# Patient Record
Sex: FEMALE | Race: WHITE
[De-identification: names, ages, dates, MRNs, and addresses within clinical notes are randomized per-mention and may not be internally consistent; named-entity substitution may affect disease eponyms.]

---

## 2019-02-11 ENCOUNTER — HOSPITAL ENCOUNTER (INPATIENT)
Dept: HOSPITAL 35 - ER | Age: 69
LOS: 2 days | Discharge: HOME HEALTH | DRG: 189 | End: 2019-02-13
Attending: INTERNAL MEDICINE | Admitting: INTERNAL MEDICINE
Payer: COMMERCIAL

## 2019-02-11 VITALS — SYSTOLIC BLOOD PRESSURE: 139 MMHG | DIASTOLIC BLOOD PRESSURE: 72 MMHG

## 2019-02-11 VITALS — DIASTOLIC BLOOD PRESSURE: 74 MMHG | SYSTOLIC BLOOD PRESSURE: 156 MMHG

## 2019-02-11 VITALS — SYSTOLIC BLOOD PRESSURE: 114 MMHG | DIASTOLIC BLOOD PRESSURE: 49 MMHG

## 2019-02-11 VITALS — BODY MASS INDEX: 36.48 KG/M2 | HEIGHT: 65.98 IN | WEIGHT: 227 LBS

## 2019-02-11 VITALS — SYSTOLIC BLOOD PRESSURE: 125 MMHG | DIASTOLIC BLOOD PRESSURE: 62 MMHG

## 2019-02-11 DIAGNOSIS — Z87.891: ICD-10-CM

## 2019-02-11 DIAGNOSIS — E78.00: ICD-10-CM

## 2019-02-11 DIAGNOSIS — Z99.81: ICD-10-CM

## 2019-02-11 DIAGNOSIS — Z79.899: ICD-10-CM

## 2019-02-11 DIAGNOSIS — I10: ICD-10-CM

## 2019-02-11 DIAGNOSIS — E66.09: ICD-10-CM

## 2019-02-11 DIAGNOSIS — J44.1: ICD-10-CM

## 2019-02-11 DIAGNOSIS — J96.21: Primary | ICD-10-CM

## 2019-02-11 DIAGNOSIS — Z88.6: ICD-10-CM

## 2019-02-11 DIAGNOSIS — E11.9: ICD-10-CM

## 2019-02-11 DIAGNOSIS — D64.9: ICD-10-CM

## 2019-02-11 LAB
ANION GAP SERPL CALC-SCNC: 5 MMOL/L (ref 7–16)
BUN SERPL-MCNC: 11 MG/DL (ref 7–18)
CALCIUM SERPL-MCNC: 9.2 MG/DL (ref 8.5–10.1)
CHLORIDE SERPL-SCNC: 101 MMOL/L (ref 98–107)
CO2 SERPL-SCNC: 39 MMOL/L (ref 21–32)
CREAT SERPL-MCNC: 0.8 MG/DL (ref 0.6–1)
ERYTHROCYTE [DISTWIDTH] IN BLOOD BY AUTOMATED COUNT: 15.8 % (ref 10.5–14.5)
GLUCOSE SERPL-MCNC: 240 MG/DL (ref 74–106)
HCT VFR BLD CALC: 29 % (ref 37–47)
HGB BLD-MCNC: 9.1 GM/DL (ref 12–15)
MCH RBC QN AUTO: 26.3 PG (ref 26–34)
MCHC RBC AUTO-ENTMCNC: 31.5 G/DL (ref 28–37)
MCV RBC: 83.5 FL (ref 80–100)
PLATELET # BLD: 254 THOU/UL (ref 150–400)
POTASSIUM SERPL-SCNC: 3.1 MMOL/L (ref 3.5–5.1)
RBC # BLD AUTO: 3.48 MIL/UL (ref 4.2–5)
SODIUM SERPL-SCNC: 145 MMOL/L (ref 136–145)
TROPONIN I SERPL-MCNC: <0.06 NG/ML (ref ?–0.06)
WBC # BLD AUTO: 7 THOU/UL (ref 4–11)

## 2019-02-11 PROCEDURE — 10045: CPT

## 2019-02-11 PROCEDURE — 15002 WOUND PREP TRK/ARM/LEG: CPT

## 2019-02-11 PROCEDURE — 10047: CPT

## 2019-02-11 NOTE — NUR
RECEIVED REPORT FROM MADELEINE NESS. PT ARRIVED UNIT ON WHEELCHAIR AT 1500H
ACCOMPANIED BY NURSING STAFF.  PT AMBULATED TO BATHROOM ON ARRIVAL ON NC 4.0L.
PT ON HEARTH AND CARB COUNT DIET. PT INSULIN ADM AND TOLERATES MEALS AND MEDS.
PT PREFERS ICE. PT EDUCATED AND ORIENTED TO UNIT. PT CURRENTLY SITTED AT
BEDSIDE LEANING ON BEDSIDE TABLE. PT EDUCATED TO USE CALL LIGHT. CONSULTED
RESPIRATORY TO SETUP CPAP FOR PT. PT HAS NO S/S OF DISTRESS AND CONT TO BE
MONITORED FOR SAFETY.

## 2019-02-11 NOTE — EKG
[address] [employer]
[place]  [address]
Phone:  [phone]                    ELECTROCARDIOGRAM REPORT      
_______________________________________________________________________________
 
Name:       JOHN ADRIAN ELIOT               Room #:         170-10      ADM IN  
M.R.#:      [id number]     Account #:      [id number]  
Admission:  19    Attend Phys:    Yanni Orosco MD        
Discharge:              Date of Birth:  [birth year]  
                                                          Report #: [id number]
   [id number]
_______________________________________________________________________________
THIS REPORT FOR:   //name//                          
 
                         [facility] ED
                                       
Test Date:    2019               Test Time:    11:15:23
Pat Name:     JOHN ADRIAN              Department:   
Patient ID:   [id number]            Room:         170
Gender:       F                        Technician:   ZAG
:          1950               Requested By: Sudhakar Alexander
Order Number: [id number] MD:   Demetrio Guillaume
                                 Measurements
Intervals                              Axis          
Rate:         87                       P:            73
IA:           150                      QRS:          26
QRSD:         85                       T:            50
QT:           351                                    
QTc:          423                                    
                           Interpretive Statements
Sinus rhythm
No significant abnormality
Compared to ECG 10/15/2008 12:21:48
No significant changes
 
Electronically Signed On 2019 12:57:11 CST by Demetrio Guillaume
[email or web address]
 
 
 
 
 
 
 
 
 
 
 
 
 
 
 
 
 
 
  <ELECTRONICALLY SIGNED>
   By: Demetrio Guillaume MD, [facility]   
  19     1257
D: 19 1115                           _____________________________________
T: 19 1115                           Demetrio Guillaume MD, FACC     /EPI

## 2019-02-12 VITALS — SYSTOLIC BLOOD PRESSURE: 157 MMHG | DIASTOLIC BLOOD PRESSURE: 86 MMHG

## 2019-02-12 VITALS — DIASTOLIC BLOOD PRESSURE: 85 MMHG | SYSTOLIC BLOOD PRESSURE: 137 MMHG

## 2019-02-12 VITALS — DIASTOLIC BLOOD PRESSURE: 60 MMHG | SYSTOLIC BLOOD PRESSURE: 126 MMHG

## 2019-02-12 VITALS — DIASTOLIC BLOOD PRESSURE: 93 MMHG | SYSTOLIC BLOOD PRESSURE: 149 MMHG

## 2019-02-12 LAB
ANION GAP SERPL CALC-SCNC: 7 MMOL/L (ref 7–16)
BUN SERPL-MCNC: 13 MG/DL (ref 7–18)
CALCIUM SERPL-MCNC: 9.7 MG/DL (ref 8.5–10.1)
CHLORIDE SERPL-SCNC: 98 MMOL/L (ref 98–107)
CO2 SERPL-SCNC: 33 MMOL/L (ref 21–32)
CREAT SERPL-MCNC: 0.9 MG/DL (ref 0.6–1)
ERYTHROCYTE [DISTWIDTH] IN BLOOD BY AUTOMATED COUNT: 16.2 % (ref 10.5–14.5)
EST. AVERAGE GLUCOSE BLD GHB EST-MCNC: 174 MG/DL
GLUCOSE SERPL-MCNC: 327 MG/DL (ref 74–106)
GLYCOHEMOGLOBIN (HGB A1C): 7.7 % (ref 4.8–5.6)
HCT VFR BLD CALC: 32 % (ref 37–47)
HGB BLD-MCNC: 9.9 GM/DL (ref 12–15)
MCH RBC QN AUTO: 25.9 PG (ref 26–34)
MCHC RBC AUTO-ENTMCNC: 31 G/DL (ref 28–37)
MCV RBC: 83.3 FL (ref 80–100)
PLATELET # BLD: 315 THOU/UL (ref 150–400)
POTASSIUM SERPL-SCNC: 4 MMOL/L (ref 3.5–5.1)
RBC # BLD AUTO: 3.85 MIL/UL (ref 4.2–5)
SODIUM SERPL-SCNC: 138 MMOL/L (ref 136–145)
WBC # BLD AUTO: 10 THOU/UL (ref 4–11)

## 2019-02-12 PROCEDURE — 5A09357 ASSISTANCE WITH RESPIRATORY VENTILATION, LESS THAN 24 CONSECUTIVE HOURS, CONTINUOUS POSITIVE AIRWAY PRESSURE: ICD-10-PCS | Performed by: INTERNAL MEDICINE

## 2019-02-12 NOTE — NUR
ASSUMED CARE OF PT 0700. DENIES PAIN, SOA WITH ACTIVITY. VSS, COMPLIANT WITH
CARES. SEEN BY DR KING TODAY WHO CLARIFIED PT CAN HAVE 2 CUPS DECAF COFFEE
DAILY NO OTHER CHANGES WILL BE MADE TO DIET AT THIS POINT. MEDICATIONS
CLARIFIED WITH PT PHARMACY. PT MOVED TO [facility].

## 2019-02-12 NOTE — NUR
ORDER RECEIVED FOR EVAL AND TREAT.  Pt IS UP AD TOMMY PER NURSING NOTES.  SPOKE
TO Pt WHO STATES SHE IS NOT HAVING ANY DIFFICULTY WITH MOBILITY AND HAS BEEN
UP AND DOWN TO THE BATHROOM.  Pt DECLINING FORMAL P.T. EVAL BUT STATES SHE
WILL CONTINUE TO MOBILIZE ON HER OWN

## 2019-02-12 NOTE — NUR
PT AWAKE AND ALERT AT THE TIME OF SHIFT CHANGE. SITTING ON THE EDGE OF THE
BED. NO COMPLAINTS AT THIS TIME. UP AD TOMMY, ON 4L NC, NO SIGNS OF DISTRESS.
 
BLOOD SUGAR HIGH AND PT REQUESTING SUGARY SNACKS AND DRINKS. EDUCATION WAS
GIVEN ON CARB CONTROL, STEROIDS AND THE HEALING PROCESS. WAS ABLE TO REDIRECT
PT TO SUGAR FREE JELLO AND DIET SPRITE. 12U SLIDING SCALE INSULIN GIVEN.
 
PT HAD NO C/O PAIN OVERNIGHT. WAS ONLY ABLE TO SLEEP ABOUT 3 HOURS WHICH SHE
STATES IS NORMAL FOR HER. ON CPAP WHILE SLEEPING. O2 SATS REMAINED >92%.
RESTLESS LEGS BOTHERED HER OVERNIGHT AND PT ASKED WHY SHE HAD NOT RECEIVED HER
REQUIP. SPOKE TO KAYLEEN CIFUENTES. ORDER RECEIVED TO RESTART REQUIP 1MG PO QHS,
INSTEAD OF THE THREE TIMES DAILY SHE TAKES IT AT HOME D/T HER COPD
EXACCERBATION.
 
PROGRESSING TOWARDS MEETING GOALS

## 2019-02-12 NOTE — 2DMMODE
[facility]
[address]
[place]  [address]
Phone:  [phone] 2 D/M-MODE ECHOCARDIOGRAM     
_______________________________________________________________________________
 
Name:            JOHN ADRIAN [place]               Room #:        455-P       ADM IN
M.R.#:           [id number]          Account #:     [id number]  
Admission:       19         Attend Phys:   Yanni Orosco MD    
Discharge:                  Date of Birth: [birth year]  
Date of Service: 19 0908               Report #:      [id number]
        [id number]
_______________________________________________________________________________
THIS REPORT FOR:   //name//                          
 
 
--------------- APPROVED REPORT --------------
 
 
Study performed:  2019 08:15:06
 
EXAM: Comprehensive 2D, Doppler, and color-flow 
Echocardiogram 
Patient Location: Echo lab   
Room #:  [facility]     Status:  routine
 
      BSA:         2.11
HR: 98 bpm BP:          149/93 mmHg 
Rhythm: NSR     
 
Other Information 
Study Quality: Excellent
 
Indications
Dyspnea 
Hx: COPD, HTN, HLP, DM
 
2D Dimensions
RVDd:  31.23 mm  
IVSd:  11.03 (7-11mm) LVOT Diam:  19.98 (18-24mm) 
LVDd:  49.53 mm  
PWd:  11.90 (7-11mm) Ascending Ao:  30.87 (22-36mm)
LVDs:  34.17 (25-40mm) 
Aortic Root:  30.44 mm 
 
Volumes
Left Atrial Volume (Systole) 
Single Plane 4CH:  48.54 mL Single Plane 2CH:  53.54 mL
    LA ESV Index:  25.00 mL/m2
 
Aortic Valve
AoV Peak Delroy.:  2.03 m/s 
AO Peak Gr.:  16.55 mmHg LVOT Max P.83 mmHg
    LVOT Max V:  1.31 m/s
SUNIL Vmax: 2.01 cm2  
 
Mitral Valve
    E/A Ratio:  0.8
    MV Decel. Time:  168.75 ms
 
 
[facility]
[address]
[place]  [address]
Phone:  [phone]                    2 D/M-MODE ECHOCARDIOGRAM     
_______________________________________________________________________________
 
Name:            ADRIANJOHN [place]               Room #:        455-[facility] [place]
..#:           [id number]          Account #:     [id number]  
Admission:       19         Attend Phys:   Yanni Orosco MD    
Discharge:                  Date of Birth: [birth year]  
Date of Service: 19 0908               Report #:      [id number]
        [id number]
_______________________________________________________________________________
MV E Max Delroy.:  1.09 m/s 
MV A Delroy.:  1.40 m/s  
MV PHT:  48.94 ms  
IVRT:  48.44 ms   
 
Pulmonary Valve
PV Peak Delroy.:  1.31 m/s PV Peak Gr.:  6.91 mmHg
 
Pulmonary Vein
P Vein S:    0.69 m/s P Vein A:  0.39 m/s
P Vein D:   0.58 m/s P Vein A Dur.:  106.1 msec
P Vein S/D Ratio:  1.19 
 
Tricuspid Valve
TR Peak Delroy.:  2.52 m/s  RAP Estimate:  5.00 mmHg
TR Peak Gr.:  25.33 mmHg 
    PA Pressure:  30.00 mmHg
 
Left Ventricle
The left ventricle is normal size. There is normal LV segmental wall 
motion. Borderline concentric left ventricular hypertrophy. Left 
ventricular systolic function is normal. LVEF is 60-65%. Mild 
diastolic dysfunction is present (impaired relaxation 
pattern).
 
Right Ventricle
The right ventricle is normal size. The right ventricular systolic 
function is normal.
 
Atria
The left atrium size is normal. The right atrium size is 
normal.
 
Aortic Valve
The aortic valve is not well visualized. No aortic regurgitation is 
present. There is no aortic valvular stenosis.
 
Mitral Valve
The mitral valve is normal in structure. Mild mitral 
regurgitation.
 
Tricuspid Valve
The tricuspid valve is normal in structure. Trace tricuspid 
regurgitation. Estimated PAP is 30mmHg.
 
Pulmonic Valve
 
 
[facility]
[address] [place] [address]
Phone:  [phone]                    2 D/M-MODE ECHOCARDIOGRAM     
_______________________________________________________________________________
 
Name:            JOHN ADRIAN [place]               Room #:        455-P       [facility] [place]
.R.#:           [id number]          Account #:     [id number]  
Admission:       19         Attend Phys:   Yanni Orosco MD    
Discharge:                  Date of Birth: [birth year]  
Date of Service: 19 0908               Report #:      [id number]
        [id number]
_______________________________________________________________________________
Pulmonic valve is not well visualized.
 
Great Vessels
The aortic root is normal in size. The ascending aorta is normal in 
size. IVC is normal in size and collapses >50% with 
inspiration.
 
Pericardium
There is no pericardial effusion.
 
<Conclusion>
Left ventricular systolic function is normal.
There is normal LV segmental wall motion.
LVEF is 60-65%. Mild diastolic dysfunction
The aortic valve is not well visualized. No aortic regurgitation or 
stenosis
The mitral valve is normal in structure. Mild mitral 
regurgitation.
Trace tricuspid regurgitation. Estimated pulmonary artery pressure of 
30mmHg.
There is no pericardial effusion.
 
 
 
 
 
 
 
 
 
 
 
 
 
 
 
 
 
 
 
 
 
 
 
  <ELECTRONICALLY SIGNED>
   By: Demetrio Guillaume MD, [facility]   
  19
D: 19                           _____________________________________
T: 19                           Demetrio Guillaume MD, FACC     /INF

## 2019-02-12 NOTE — NUR
PATIENT TRANSFERRED FROM [facility], REPORT FROM DRAKE/RN. PATIENT ALERT AND
ORIENTED X 4. PATIENT UP AD TOMMY TO BATHROOM. PATIENT HAS O2 AT 4 LITERS/NC IN
PLACE AND SHE USE AT HOME/CPAP AT NIGHT. PATIENT'S BLOOD SUGAR HAS BEEN
ELEVATED SINCE LUNCH 453, NOTIFIED DR KING, SHE ORDERED TO REMOVE LUNCH TRAY
CHECK HOURLY UNTIL UNDER 250, CONTINUE GIVING INSULIN EACH TIME PER S/S, S/S
SCALE CHANGED TO MODERATE, AND START LANTUS 20 UNITS TONIGHT. LAST BLOOD SUGAR
265 AT 1820, 12 UNITS GIVEN. THIS RN CALLED DR KING AT 1700 TO GIVE UPDATE,
NO RETURN CALL BACK, PATIENT ATE HER DINNER. WILL CONTINUE TO MONITOR.

## 2019-02-13 VITALS — DIASTOLIC BLOOD PRESSURE: 97 MMHG | SYSTOLIC BLOOD PRESSURE: 182 MMHG

## 2019-02-13 VITALS — SYSTOLIC BLOOD PRESSURE: 170 MMHG | DIASTOLIC BLOOD PRESSURE: 93 MMHG

## 2019-02-13 LAB
ANION GAP SERPL CALC-SCNC: 7 MMOL/L (ref 7–16)
BUN SERPL-MCNC: 16 MG/DL (ref 7–18)
CALCIUM SERPL-MCNC: 9.6 MG/DL (ref 8.5–10.1)
CHLORIDE SERPL-SCNC: 98 MMOL/L (ref 98–107)
CO2 SERPL-SCNC: 34 MMOL/L (ref 21–32)
CREAT SERPL-MCNC: 0.9 MG/DL (ref 0.6–1)
ERYTHROCYTE [DISTWIDTH] IN BLOOD BY AUTOMATED COUNT: 16.4 % (ref 10.5–14.5)
GLUCOSE SERPL-MCNC: 276 MG/DL (ref 74–106)
HCT VFR BLD CALC: 29.3 % (ref 37–47)
HGB BLD-MCNC: 9.4 GM/DL (ref 12–15)
MCH RBC QN AUTO: 26.8 PG (ref 26–34)
MCHC RBC AUTO-ENTMCNC: 32.2 G/DL (ref 28–37)
MCV RBC: 83 FL (ref 80–100)
PLATELET # BLD: 262 THOU/UL (ref 150–400)
POTASSIUM SERPL-SCNC: 4 MMOL/L (ref 3.5–5.1)
RBC # BLD AUTO: 3.53 MIL/UL (ref 4.2–5)
SODIUM SERPL-SCNC: 139 MMOL/L (ref 136–145)
WBC # BLD AUTO: 8.4 THOU/UL (ref 4–11)

## 2019-02-13 NOTE — NUR
INITIAL ASSESSMEN:
MICHELLE reviewed chart and spoke with nursing and attending physician. Pt was
admitted from home due to exacerbation of COPD/Hypoxia. Pt was [facility] to
[facility] from  and is progressing towards goals for discharge. MICHELLE met
[facility] pt at bedside. Introduced role of SW. Pt is alert/orientated x 4. Pt
reports she lives at home with her dtr. Prior to admission, pt was independent
with ADLs. Pt has a walker at home. Pt has home O2 and cpap provided by
[facility]. Pt is normally on 4L of continuous O2. Pt is currently on
service with [facility] for HH services. RN and PT see pt twice per week. Pt's PCP is
Dr. Zamzam Montaño. Plan is for pt to discharge home and resume HH when
medically stable. Pt's family will be able to provide transportation home. MICHELLE
is following to assist as needed with discharge planning.

## 2019-02-13 NOTE — NUR
Pt A/OX4,up ad bree in room. C/o a headache at HS medicated with Tylenol with
relief reported. Pt's accucheck was 236 at 2140 so no more hourly accuchecks
done and pt was pleased with the results as well. VSS.Voiding without any
difficulties voiced. Pt wore the CPAP on until 0200 and requested to be turned
off and back on O2 @ 4L/NC.Resting on the recliner at this time eyes closed,no
distress noted. Call light/personal items placed within reach. Will continue
to monitor pt.

## 2019-02-13 NOTE — NUR
dp faxed clinical info and dc orders to [facility], dp called [facility] to verify they
received, patient home today.

## 2019-02-13 NOTE — HC
[facility]
[address]
[place]   [address]                     CONSULTATION                  
_______________________________________________________________________________
 
Name:       JOHN ADRIAN               Room #:         226-P       [facility] [place]  
.R.#:      [id number]                       Account #:      [id number]  
Admission:  02/11/19    Attend Phys:    Yanni Orosco MD        
Discharge:  02/13/19    Date of Birth:  [birth year]  
                                                          Report #: [id number]
                                                                    [id number]   
_______________________________________________________________________________
THIS REPORT FOR:   //name//                          
 
CC: Zamzam Orosco
 
DATE OF SERVICE:  02/11/2019
 
 
TYPE OF REPORT:  Pulmonary consultation. 
 
REFERRING PHYSICIAN:  Yanni Orosco M.D. 
 
REASON FOR REFERRAL:  COPD.
 
HISTORY OF PRESENT ILLNESS:  The patient is a 68-year-old white female who
presents to the Emergency Room with progressive dyspnea.  She has known history
of COPD.  A Pulmonary consultation was requested.
 
The patient states that she is followed normally by the pulmonologist at
[facility].  She states that she has had frequent hospitalizations
over the past year.  She has been hospitalized at least once or twice per month
over the last several months.  She was told by the [facility] that
she could no longer be there as they have nothing else to offer her.  For that
reason, the patient is here for further evaluation.  Her pulmonologist practices
at [facility].
 
The patient has smoked for many years but quit in 2017.  She knows that she gets
recurrent dyspnea.  As mentioned above, she has had trouble with frequent
recurrent exacerbation of COPD and the reasons of this is unclear.
 
On this occasion, she has been doing fair until the past few days prior to
presentation, she noticed increasing dyspnea and lower extremity edema. 
Otherwise, denies any chest pain, night sweats or chills, productive cough or
hemoptysis.
 
She is on chronic O2 at 4 liters.
 
PAST MEDICAL HISTORY:  Notable for COPD, O2 dependent at 4 liters; diabetes
mellitus type 2 and hypercholesterolemia.
 
ALLERGIES:  ASPIRIN, which causes hives.
 
HOME MEDICATIONS:  Include zolpidem, Norvasc, Lipitor, clonazepam, Daliresp,
iron, Lasix, Avapro, Janumet, Synthroid, Mobic, Toprol-XL, Proventil and Requip.
 
FAMILY HISTORY:  Noncontributory.
 
 
 
[facility]
[address] [place]   [address]                     CONSULTATION                  
_______________________________________________________________________________
 
Name:       JOHN ADRIAN ELIOT               Room #:         226-P       [facility] [place]  
.R.#:      [id number]                       Account #:      [id number]  
Admission:  02/11/19    Attend Phys:    Yanni Orosco MD        
Discharge:  02/13/19    Date of Birth:  [birth year]  
                                                          Report #: [id number]
                                                                    [id number]   
_______________________________________________________________________________
 
SOCIAL HISTORY:  Tobacco use as mentioned above, having smoked most of her life
until 2017.  She denies any alcohol use.  She lives in [place] [facility].
 
REVIEW OF SYSTEMS:  As mentioned above.  It is notable for frequent
hospitalizations over the past year for dyspnea.
 
PHYSICAL EXAMINATION:
GENERAL:  She is awake and alert, in no distress.  
VITAL SIGNS:  Temperature is 97.7 degrees Fahrenheit, pulse is 100, respiratory
rate is 20, blood pressure 157/86 mmHg and saturation 97%.
HEENT:  Normocephalic and atraumatic.
NECK:  Supple, without any lymphadenopathy or thyromegaly.
CHEST:  Breath sounds are fair bilaterally.  Mild expiratory wheezes.
CARDIOVASCULAR:  Normal S1 and S2.  No murmurs or gallop.  There is no JVD. 
There is no carotid bruit.  Pulses are 2+/4+ bilaterally.
ABDOMEN:  Soft and nontender.  No organomegaly or masses felt.
GENITOURINARY:  Deferred.
RECTAL:  Deferred.
EXTREMITIES:  There is no cyanosis, clubbing or edema.
 
RADIOLOGICAL DATA:  EKG shows no acute ischemic changes.  Chest x-ray shows mild
bibasilar atelectasis.
 
IMPRESSION:
1.  Recurrent dyspnea in this 68-year-old white female.  According to the
patient, she has had multiple hospitalizations in the past for chronic
obstructive pulmonary disease exacerbation.  Cause of his recurrent exacerbation
is unclear.
2.  Chronic obstructive pulmonary disease, with frequent exacerbation.  Possible
cause may be a nocturnal reflux, possible sleep related breathing disorder. 
Recommend the patient follow closely with a pulmonologist.
3.  Moderate obesity, the patient should be screened for sleep disorder that
this can be done as an outpatient.
4.  Diabetes mellitus type 2.
5.  Hypertension.
 
RECOMMENDATIONS:  Agree with current medical treatment plans including
bronchodilators, corticosteroids and broad-spectrum antibiotics.  DVT and GI
prophylaxis recommended.
 
As mentioned above with frequent hospitalizations, we would recommend that she
follow closely with the physicians including a pulmonologist.  Also, as an
outpatient, she should be screened for possible sleep related breathing disorder
given the above frequent COPD exacerbations.
 
 
 
 
[address]   [address]                     CONSULTATION                  
_______________________________________________________________________________
 
Name:       JOHN ADRIAN ELIOT               Room #:         226-P       [facility] [place]  
M.R.#:      [id number]                       Account #:      [id number]  
Admission:  02/11/19    Attend Phys:    Yanni Orosco MD        
Discharge:  02/13/19    Date of Birth:  [birth year]  
                                                          Report #: [id number]
                                                                    [id number]   
_______________________________________________________________________________
Lastly, we would recommend echocardiogram to rule out LV dysfunction resulting
in heart failure, etc.
 
Thank you for this consultation.
 
 
 
 
 
 
 
 
 
 
 
 
 
 
 
 
 
 
 
 
 
 
 
 
 
 
 
 
 
 
 
 
 
 
 
 
 
 
 
 
  <ELECTRONICALLY SIGNED>
   By: Ajit Headley MD                  
  02/13/19     1939
D: 02/12/19 1639                           _____________________________________
T: 02/12/19 2237                           Ajit Headley MD                    /nt

## 2019-02-13 NOTE — NUR
ASSUMED CARE OF PATIENT AT 0715, PATIENT ALERT AND ORIENTED X 4. UP AD TOMMY.
PATIENT DENIES PAIN, BUT HAD PAIN AT IV SITE, DR WHITE NOTIFIED, IV REMOVED,
PATIENT GIVEN TRAMADOL, PAIN RESOLVED. BLOOD SUGAR MONITORING AS ORDERED,
INSULIN GIVEN PER S/S. DR WHITE DISCHARGED PATIENT TO HOME WITH HOME HEALTH.
ALL DISCHARGE PAPERWORK SENT WITH PATIENT, AND ALL PERSONAL BELONGONGS SENT
WITH PATIENT.

## 2020-06-10 ENCOUNTER — HOSPITAL ENCOUNTER (INPATIENT)
Dept: HOSPITAL 35 - ER | Age: 70
LOS: 3 days | Discharge: HOME HEALTH | DRG: 871 | End: 2020-06-13
Attending: HOSPITALIST | Admitting: HOSPITALIST
Payer: COMMERCIAL

## 2020-06-10 VITALS — HEIGHT: 65.98 IN | WEIGHT: 233.2 LBS | BODY MASS INDEX: 37.48 KG/M2

## 2020-06-10 VITALS — SYSTOLIC BLOOD PRESSURE: 151 MMHG | DIASTOLIC BLOOD PRESSURE: 90 MMHG

## 2020-06-10 VITALS — SYSTOLIC BLOOD PRESSURE: 198 MMHG | DIASTOLIC BLOOD PRESSURE: 84 MMHG

## 2020-06-10 VITALS — DIASTOLIC BLOOD PRESSURE: 90 MMHG | SYSTOLIC BLOOD PRESSURE: 162 MMHG

## 2020-06-10 VITALS — SYSTOLIC BLOOD PRESSURE: 116 MMHG | DIASTOLIC BLOOD PRESSURE: 75 MMHG

## 2020-06-10 DIAGNOSIS — E11.40: ICD-10-CM

## 2020-06-10 DIAGNOSIS — I50.31: ICD-10-CM

## 2020-06-10 DIAGNOSIS — Z99.81: ICD-10-CM

## 2020-06-10 DIAGNOSIS — M10.9: ICD-10-CM

## 2020-06-10 DIAGNOSIS — B96.20: ICD-10-CM

## 2020-06-10 DIAGNOSIS — E87.1: ICD-10-CM

## 2020-06-10 DIAGNOSIS — J96.21: ICD-10-CM

## 2020-06-10 DIAGNOSIS — E78.5: ICD-10-CM

## 2020-06-10 DIAGNOSIS — E66.9: ICD-10-CM

## 2020-06-10 DIAGNOSIS — Z87.891: ICD-10-CM

## 2020-06-10 DIAGNOSIS — E03.9: ICD-10-CM

## 2020-06-10 DIAGNOSIS — Z79.51: ICD-10-CM

## 2020-06-10 DIAGNOSIS — M54.9: ICD-10-CM

## 2020-06-10 DIAGNOSIS — G47.00: ICD-10-CM

## 2020-06-10 DIAGNOSIS — J44.1: ICD-10-CM

## 2020-06-10 DIAGNOSIS — G89.29: ICD-10-CM

## 2020-06-10 DIAGNOSIS — M19.90: ICD-10-CM

## 2020-06-10 DIAGNOSIS — K21.9: ICD-10-CM

## 2020-06-10 DIAGNOSIS — I11.0: ICD-10-CM

## 2020-06-10 DIAGNOSIS — E11.36: ICD-10-CM

## 2020-06-10 DIAGNOSIS — A41.9: Primary | ICD-10-CM

## 2020-06-10 DIAGNOSIS — E78.00: ICD-10-CM

## 2020-06-10 DIAGNOSIS — N39.0: ICD-10-CM

## 2020-06-10 DIAGNOSIS — Z79.899: ICD-10-CM

## 2020-06-10 DIAGNOSIS — Z91.048: ICD-10-CM

## 2020-06-10 DIAGNOSIS — E11.65: ICD-10-CM

## 2020-06-10 LAB
ALBUMIN SERPL-MCNC: 3.1 G/DL (ref 3.4–5)
ALT SERPL-CCNC: 31 U/L (ref 30–65)
ANION GAP SERPL CALC-SCNC: < 0 MMOL/L (ref 7–16)
AST SERPL-CCNC: 30 U/L (ref 15–37)
BACTERIA-REFLEX: (no result) /HPF
BASOPHILS NFR BLD AUTO: 1.2 % (ref 0–2)
BILIRUB DIRECT SERPL-MCNC: < 0.1 MG/DL
BILIRUB SERPL-MCNC: 0.3 MG/DL (ref 0.2–1)
BILIRUB UR-MCNC: NEGATIVE MG/DL
BUN SERPL-MCNC: 9 MG/DL (ref 7–18)
CALCIUM SERPL-MCNC: 8 MG/DL (ref 8.5–10.1)
CHLORIDE SERPL-SCNC: 93 MMOL/L (ref 98–107)
CO2 SERPL-SCNC: 31 MMOL/L (ref 21–32)
COLOR UR: YELLOW
CREAT SERPL-MCNC: 0.8 MG/DL (ref 0.6–1)
EOSINOPHIL NFR BLD: 2 % (ref 0–3)
ERYTHROCYTE [DISTWIDTH] IN BLOOD BY AUTOMATED COUNT: 18.4 % (ref 10.5–14.5)
GLUCOSE SERPL-MCNC: 383 MG/DL (ref 74–106)
GRANULOCYTES NFR BLD MANUAL: 69.4 % (ref 36–66)
HCT VFR BLD CALC: 37.5 % (ref 37–47)
HGB BLD-MCNC: 12.4 GM/DL (ref 12–15)
KETONES UR STRIP-MCNC: NEGATIVE MG/DL
LYMPHOCYTES NFR BLD AUTO: 22.7 % (ref 24–44)
MCH RBC QN AUTO: 29.8 PG (ref 26–34)
MCHC RBC AUTO-ENTMCNC: 33.2 G/DL (ref 28–37)
MCV RBC: 89.9 FL (ref 80–100)
MONOCYTES NFR BLD: 4.7 % (ref 1–8)
NEUTROPHILS # BLD: 5 THOU/UL (ref 1.4–8.2)
PLATELET # BLD: 291 THOU/UL (ref 150–400)
POTASSIUM SERPL-SCNC: 3.7 MMOL/L (ref 3.5–5.1)
PROT SERPL-MCNC: 6 G/DL (ref 6.4–8.2)
RBC # BLD AUTO: 4.17 MIL/UL (ref 4.2–5)
RBC # UR STRIP: NEGATIVE /UL
SODIUM SERPL-SCNC: 121 MMOL/L (ref 136–145)
SP GR UR STRIP: 1.02 (ref 1–1.03)
SQUAMOUS: (no result) /LPF (ref 0–3)
TROPONIN I SERPL-MCNC: <0.06 NG/ML (ref ?–0.06)
URINE CLARITY: CLEAR
URINE GLUCOSE-RANDOM*: (no result)
URINE LEUKOCYTES-REFLEX: NEGATIVE
URINE NITRITE-REFLEX: POSITIVE
URINE PROTEIN (DIPSTICK): NEGATIVE
URINE WBC-REFLEX: (no result) /HPF (ref 0–5)
UROBILINOGEN UR STRIP-ACNC: 0.2 E.U./DL (ref 0.2–1)
WBC # BLD AUTO: 7.2 THOU/UL (ref 4–11)

## 2020-06-10 PROCEDURE — 10081 I&D PILONIDAL CYST COMP: CPT

## 2020-06-10 NOTE — NUR
ASSUMED CARE APPROX 1750.  PT ADMITTED FROM ED TO THIS UNIT.  PT ALERT AND
ORIENTED X4.  ASSESSMENT AS CHARTED AND VSS.  PT'S ADMISSION COMPLETE.
CONSENTS SIGNED AND PLACED IN CHART. SR W/ PVC'S ON TELE MONITOR.  PT ON 6LNC
WITH NO SIGNS OF RESPIRATORY DISTRESS NOTED. PT DENIES ACUTE PAIN. WILL
CONTINUE TO MONITOR.

## 2020-06-10 NOTE — EKG
[facility]
[place]   [address]                     ELECTROCARDIOGRAM REPORT      
_______________________________________________________________________________
 
Name:       JOHN ADRIAN               Room #:                     [facility]#:      [id number]                       Account #:      [id number]  
Admission:  06/10/20    Attend Phys:                          
Discharge:              Date of Birth:  [birth year]  
                                                          Report #: [id number]
                                                                    [id number]
_______________________________________________________________________________
THIS REPORT FOR:  
 
cc:  Zamzam Montaño Christine L. DO Lundgren, Craig H. MD [facility]
THIS REPORT FOR:   //name//                          
 
                         [facility] ED
                                       
Test Date:    2020-06-10               Test Time:    14:30:57
Pat Name:     JOHN ADRIAN              Department:   
Patient ID:   [id number]            Room:          
Gender:       F                        Technician:   
:          1950               Requested By: Carmina Frank
Order Number: [id number] MD:   Demetrio Guillaume
                                 Measurements
Intervals                              Axis          
Rate:         88                       P:            56
KS:           157                      QRS:          10
QRSD:         86                       T:            35
QT:           374                                    
QTc:          453                                    
                           Interpretive Statements
Sinus rhythm
No significant abnormality
Compared to ECG 2019 11:15:23
No significant changes
 
Electronically Signed On 6- 16:13:59 CDT by Demetrio Guillaume
[email or web address]
 
 
 
 
 
 
 
 
 
 
 
 
 
 
  <ELECTRONICALLY SIGNED>
   By: Demetrio Guillaume MD, [facility]   
  06/10/20     1613
D: 06/10/20 1430                           _____________________________________
T: 06/10/20 1430                           Demetrio Guillaume MD, [facility]     /EPI

## 2020-06-11 VITALS — DIASTOLIC BLOOD PRESSURE: 79 MMHG | SYSTOLIC BLOOD PRESSURE: 147 MMHG

## 2020-06-11 VITALS — DIASTOLIC BLOOD PRESSURE: 112 MMHG | SYSTOLIC BLOOD PRESSURE: 158 MMHG

## 2020-06-11 VITALS — SYSTOLIC BLOOD PRESSURE: 153 MMHG | DIASTOLIC BLOOD PRESSURE: 89 MMHG

## 2020-06-11 VITALS — SYSTOLIC BLOOD PRESSURE: 155 MMHG | DIASTOLIC BLOOD PRESSURE: 100 MMHG

## 2020-06-11 VITALS — SYSTOLIC BLOOD PRESSURE: 132 MMHG | DIASTOLIC BLOOD PRESSURE: 75 MMHG

## 2020-06-11 VITALS — SYSTOLIC BLOOD PRESSURE: 160 MMHG | DIASTOLIC BLOOD PRESSURE: 100 MMHG

## 2020-06-11 LAB
ANION GAP SERPL CALC-SCNC: 2 MMOL/L (ref 7–16)
BUN SERPL-MCNC: 9 MG/DL (ref 7–18)
CALCIUM SERPL-MCNC: 8.3 MG/DL (ref 8.5–10.1)
CHLORIDE SERPL-SCNC: 97 MMOL/L (ref 98–107)
CO2 SERPL-SCNC: 34 MMOL/L (ref 21–32)
CREAT SERPL-MCNC: 0.9 MG/DL (ref 0.6–1)
ERYTHROCYTE [DISTWIDTH] IN BLOOD BY AUTOMATED COUNT: 18.4 % (ref 10.5–14.5)
GLUCOSE SERPL-MCNC: 330 MG/DL (ref 74–106)
HCT VFR BLD CALC: 39.2 % (ref 37–47)
HGB BLD-MCNC: 12.9 GM/DL (ref 12–15)
MCH RBC QN AUTO: 29.6 PG (ref 26–34)
MCHC RBC AUTO-ENTMCNC: 32.9 G/DL (ref 28–37)
MCV RBC: 89.7 FL (ref 80–100)
PLATELET # BLD: 262 THOU/UL (ref 150–400)
POTASSIUM SERPL-SCNC: 4.6 MMOL/L (ref 3.5–5.1)
RBC # BLD AUTO: 4.37 MIL/UL (ref 4.2–5)
SODIUM SERPL-SCNC: 133 MMOL/L (ref 136–145)
WBC # BLD AUTO: 7.1 THOU/UL (ref 4–11)

## 2020-06-11 NOTE — NUR
RECEIVED PT'S CARE AROUND 0715; PT. ON BED; AOX4; DURING AM ASSESSMENT C/O
PAIN; MEDICATION GIVEN AT 0600; HOME MEDICATION UPDATE; PHYSICIAN NOTIFIED
DURING ROUNDING; AM MEDICATION GIVEN; ELEVATED BS; PHYSICIAN NOTIFIED; ORDERS
ON PLACED; THROUGH THE AFTERNOON C/O BACK PAIN; PRN PPRN PAIN MEDICATION
GIVEN; EDUCATED ABOUT FALL PREVENTIONS; NEEDS TO BE REMAINED; SR ON THE
MONITOR; ASSESSMENT AS CHARGED; FOLLOWING POC; PASSED ON REPORT;

## 2020-06-11 NOTE — HC
[facility]
Garbiele Shar[place]   [address]                     CONSULTATION                  
_______________________________________________________________________________
 
Name:       JOHN ADRIAN               Room #:         213-P       [facility] [place]  
M.R.#:      [id number]                       Account #:      [id number]  
Admission:  06/10/20    Attend Phys:    Issac Castillo MD    
Discharge:              Date of Birth:  [birth year]  
                                                          Report #: [id number]
                                                                    [id number]   
_______________________________________________________________________________
THIS REPORT FOR:  
 
cc:  Zamzam Montaño,Ross Butterfield MD                                         ~
CC: Issac Montaño
 
DATE OF SERVICE:  06/11/2020
 
 
ENDOCRINE CONSULTATION NOTE
 
CONSULTING PHYSICIAN:  Dr. Issac Casitllo.
 
PRIMARY CARE PHYSICIAN:  Dr. Zamzam Montaño.
 
REASON FOR CONSULTATION:  Severe hyperglycemia, uncontrolled type 2 diabetes
mellitus.
 
HISTORY OF PRESENT ILLNESS:  This is a 70-year-old female patient whose medical
background is significant for multiple medical issues including type 2 diabetes
mellitus that she had for several years and that is maintained on Janumet
 mg taken as 2 tablets daily in addition to Humalog supplemental scale
that she only takes as needed, which is only occasional.  She describes blood
glucose values that run between 120 and 180 mg/dL for the most part.  She denies
issues with hypoglycemia.
 
The patient describes complications relating to cataracts, but not diabetic
retinopathy, she also has been dealing with worsening issues of diabetic
neuropathy affecting both hands and feet and occasionally resulting in sleep
disturbances.  She is not known to have chronic kidney disease or coronary
artery disease.
 
Also, the patient is hypothyroid and has been so for many years.  She is
currently maintained on levothyroxine 175 mcg daily, which she admits to
occasionally missing at least a couple times a week.  She has been fatigued,
tired and unable to lose weight.
Also, the patient is hyperlipidemic and is maintained on atorvastatin 20 mg at
bedtime.  She is hypertensive and is maintained on irbesartan 300 mg daily,
amlodipine 10 mg daily.  She is also on Toprol- mg daily.
 
The patient has COPD and is maintained on oxygen 4-6 liters 24 hours daily.  She
is also maintained on prednisone 40 mg daily.
 
REVIEW OF SYSTEMS:
 
 
 
[address]   [address]                     CONSULTATION                  
_______________________________________________________________________________
 
Name:       JOHN ADRIAN Moline               Room #:         213-P       [place]  
M.R.#:      [id number]                       Account #:      [id number]  
Admission:  06/10/20    Attend Phys:    Issac Castillo MD    
Discharge:              Date of Birth:  [birth year]  
                                                          Report #: [id number]
                                                                    [id number]   
_______________________________________________________________________________
CONSTITUTIONAL:  Fatigue, tiredness, but no fever or chills or body weight
changes.
HEENT:  Negative for sore throat, sinus pain or ear drainage.
PULMONARY:  Baseline COPD, O2 dependent, prednisone dependent.  Intermittent
cough, no hemoptysis.
CARDIAC:  Dyspnea on exertion, lower extremity edema, occasional palpitations,
no chest pain.
GASTROINTESTINAL:  Negative for abdominal pain, nausea, vomiting or changes in
bowel frequency.
NEUROLOGY:  Negative for loss of consciousness, seizure activity.  Severe
frequent headaches, but she has baseline diabetic peripheral neuropathy
affecting both feet and hands.  Otherwise, review of systems noncontributory
unless mentioned in HPI.
 
PAST MEDICAL HISTORY:
1.  Type 2 diabetes mellitus.
2.  Diabetic neuropathy.
3.  Cataracts.
4.  Hypertension.
5.  Hyperlipidemia.
6.  Obesity.
7.  COPD, O2 dependent and steroid dependent.
8.  Gout.
9.  Osteoarthritis.
10.  Congestive heart failure.
 
OUTPATIENT MEDICATIONS:  Include sitagliptin/metformin 50/1000 mg 2 pills daily,
levothyroxine 175 mcg daily, Mobic 50 mg at bedtime, Toprol- mg daily,
albuterol 2 puffs q.  4 hours p.r.n., Requip 1 mg p.o. t.i.d., irbesartan 300 mg
daily, Lasix 40 mg b.i.d., ferrous sulfate 325 mg at bedtime, clonazepam 2 mg at
bedtime, atorvastatin 20 mg at bedtime, amlodipine 10 mg daily, allopurinol 100
mg daily.
 
ALLERGIES:  No known drug allergies.
 
FAMILY HISTORY:  Noncontributory.
 
SOCIAL HISTORY:  The patient lives with her daughter.  She used to be a smoker,
but quit 4 years ago.  Denies use of alcohol or illicit drugs.
 
PHYSICAL EXAMINATION:
GENERAL:  Pleasant  female patient who is not in apparent pain or
distress.
VITAL SIGNS:  Blood pressure is 155/100 mmHg, heart rate is 90 beats per minute,
respirations 20 per minute, temperature 37.0 Celsius.
CONSTITUTIONAL:  The patient is sitting upright, appears comfortable, not in
 
 
 
[place] [address]                     CONSULTATION                  
_______________________________________________________________________________
 
Name:       JOHN ADRIAN ELIOT               Room #:         213-P       [facility] [place]  
M.R.#:      [id number]                       Account #:      [id number]  
Admission:  06/10/20    Attend Phys:    Issac Castillo MD    
Discharge:              Date of Birth:  [birth year]  
                                                          Report #: [id number]
                                                                    [id number]   
_______________________________________________________________________________
apparent distress.
HEENT:  Anicteric sclerae.  Intact extraocular motions.
NECK:  Supple, without JVD or thyromegaly.
CHEST:  Noted for distant and limited air entry with scattered rales, rhonchi
and wheezes, no crackles.
HEART:  Regular rate and rhythm without murmurs or gallops.
ABDOMEN:  Soft, lax.  No guarding.  Active bowel sounds.
EXTREMITIES:  Lower extremity exam is noted for trace ankle edema bilaterally. 
No skin breaks or ulcerations.  Pedal pulses are faint.
NEUROLOGIC:  Awake, alert and oriented to time, place and person.  The remainder
of her examination is largely nonfocal other than for peripheral sensory
deficits.
PSYCHIATRIC:  Pleasant, interactive.  Normal mood and affect.  Normal thought
process.
 
LABORATORY DATA:  Blood glucose on arrival was 231 and then quickly gena to 264,
367 and most recently 400 mg/dL.  Sodium 133, potassium 4.6, chloride 97, CO2 of
34, anion gap 2, BUN 9, creatinine 0.9.  Lipase 366, AST 30, total bilirubin
0.3, direct bilirubin less than 0.1, calcium 8.3, alkaline phosphatase 119, ALT
31, total protein 6.0, albumin 3.0.  EGFR 62.  Free T4 of 0.4.  Free T3 1.23. 
White blood count 7.1, hemoglobin 12.9, hematocrit 39.2, platelets 262.  TSH
35.123.  Hemoglobin A1c in February 2019 was 7.7.
 
ASSESSMENT AND PLAN:
1.  Type 2 diabetes mellitus.  The patient is typically well maintained on
Janumet XR monotherapy.  However, she has had an acute and severe worsening in
the setting of using high dose intravenous glucocorticoids due to her COPD
exacerbation.  Given this outlook, the patient will be placed on a Humalog 12
units t.i.d. a.c. with meals in addition to Lantus 18 units daily.  Blood
glucose monitoring will commence a.c. and at bedtime and further adjustments
will be made accordingly.  I will maintain coverage with Humalog supplemental
scale, moderate intensity to be used and supplement to the above stated regimen
as needed.  Also, I would like to resume metformin 750 mg b.i.d. given the
steroid-induced hyperglycemia that we are dealing with.
2.  Hypothyroidism.  The patient has uncontrolled hypothyroidism and appears to
have issues with consistency and compliance of intake.  The patient was
counseled at length about the importance of consistent intake and maintained
control of her thyroid state.  Given her indices, I will adjust the patient's
levothyroxine dose to 250 mcg daily.  TFT followup will be needed in 6-8 weeks
from now.
3.  Hyperlipidemia.  The patient is maintained on atorvastatin therapy and
tolerates it well, she is to continue with the same.
4.  Hypertension.  The patient's level of blood pressure control has been mostly
adequate with some marginal readings, she is to continue with the same.
 
 
 
[facility]
[address]
[place]   [address]                     CONSULTATION                  
_______________________________________________________________________________
 
Name:       JOHN ADRIAN Moline               Room #:         213-P       [facility] [place]  
M.R.#:      [id number]                       Account #:      [id number]  
Admission:  06/10/20    Attend Phys:    Issac Castillo MD    
Discharge:              Date of Birth:  [birth year]  
                                                          Report #: [id number]
                                                                    [id number]   
_______________________________________________________________________________
 
I certainly appreciate this consultation by Dr. Castillo.
 
 
 
 
 
 
 
 
 
 
 
 
 
 
 
 
 
 
 
 
 
 
 
 
 
 
 
 
 
 
 
 
 
 
 
 
 
 
 
 
 
 
  <ELECTRONICALLY SIGNED>
   By: Ross Stern MD         
  06/11/20     1602
D: 06/11/20 1231                           _____________________________________
T: 06/11/20 1251                           Ross Stern MD           /nt

## 2020-06-11 NOTE — NUR
chart review. cm visited with pt via phone call. intro to cm and transition of
care ie hh. she a & o x 4 and able to make her needs know. hina reported "
live at home with daughter felicita she is my care giver, then she has to
work at night. live in small small apartment. have cane, walker and wheel
chair. daughter runs errands if need something. drt drives. manage own
medication. o2 4 L/nc cont then if can not breathe increase to 5L. have
breathing tx and cpap at home. all from [facility]. all equipment function
properly. medical [facility] is trying to get me new cpap machine. hh in
past"/hina. will cont following as needed for dc needs.

## 2020-06-11 NOTE — NUR
ASSUMED CARE 1900. PT ALERT AND ORIENTED. VSS. DENIES CHEST PAIN, NAUSEA OR
VOMITING.  PT C/O SOB, SOLUMEDROL GIVEN AS SCHEDULED.  NEW IV START BY ICU
NURSE IN THE LEFT AC. PT CONTINUE TO REPORT BACK PAIN. HYDROCODONE PRN GIVEN.
ON 5L OF 02. INDIPENDENT ADLs. VOIDING PER BEDSIDE COMMODE. PT PERIODICALLY
WHEEZES. SCHEDULED NEBULIZERS. NO FURTHER CONCERNS. WILL CONTINUE TO MONITOR.

## 2020-06-11 NOTE — 2DMMODE
[facility]
Gabrie[address] [place]   [address]                   2 D/M-MODE ECHOCARDIOGRAM     
_______________________________________________________________________________
 
Name:       JOHN ADRIAN ELIOT               Room #:         213-P       [place]  
M.R.#:      [id number]                       Account #:      [id number]  
Admission:  06/10/20    Attend Phys:    Isasc Castillo MD    
Discharge:              Date of Birth:  [birth year]  
                                                          Report #: [id number]
                                                                    [id number]
_______________________________________________________________________________
THIS REPORT FOR:  
 
cc:  Zamzam Montaño,Zamzam Sosa,Ronny TURCIOS MD                                                   
                                                                       ~
 
--------------- APPROVED REPORT --------------
 
 
Study performed:  2020 09:30:43
 
EXAM: Comprehensive 2D, Doppler, and color-flow 
Echocardiogram 
Patient Location: Bedside   
Room #:  213     Status:  routine
 
      BSA:         2.12
HR: 77 bpm BP:          155/100 mmHg 
Rhythm: NSR     
 
Other Information 
Study Quality: Adequate
Technically limited study due to  COPD and morbid 
obesity.
 
Indications
Congestive Heart Failure
Dyspnea 
Hx: COPD, DM, HTN, HLP.
 
2D Dimensions
RVDd:  31.87 mm  
IVSd:  12.36 (7-11mm) LVOT Diam:  21.31 (18-24mm) 
LVDd:  43.51 mm  
PWd:  12.38 (7-11mm) 
LVDs:  33.58 (25-40mm) 
Aortic Root:  32.15 mm 
 
Volumes
Left Atrial Volume (Systole) 
Single Plane 4CH:  40.35 mL Single Plane 2CH:  48.36 mL
    LA ESV Index:  22.00 mL/m2
 
Aortic Valve
AoV Peak Delroy.:  2.08 m/s 
 
 
[facility]
[address]
[place]  [address]
Phone:  [phone]                    2 D/M-MODE ECHOCARDIOGRAM     
_______________________________________________________________________________
 
Name:            JOHN ADRIAN ELIOT               Room #:        213-       ADM [place]
M.R.#:           [id number]          Account #:     [id number]  
Admission:       06/10/20         Attend Phys:   Issac Castillo MD
Discharge:                  Date of Birth: [birth year]  
                         Report #:      [id number]
        [id number]
_______________________________________________________________________________
AO Peak Gr.:  17.27 mmHg LVOT Max P.32 mmHg
AO Mean Gr.:  9.71 mmHg  
AO V2 Mean:  1.50 m/s  LVOT Max V:  1.26 m/s
AO V2 VTI:  46.14 cm  
SUNIL Vmax: 2.16 cm2  
 
Mitral Valve
    E/A Ratio:  0.8
    MV Decel. Time:  205.31 ms
MV E Max Delroy.:  0.95 m/s 
MV A Delroy.:  1.15 m/s  
MV PHT:  59.54 ms  
IVRT:  79.58 ms   
 
Pulmonary Valve
PV Peak Delroy.:  0.88 m/s PV Peak Gr.:  3.09 mmHg
 
Pulmonary Vein
P Vein S:    0.47 m/s P Vein A:  0.44 m/s
P Vein D:   0.36 m/s P Vein A Dur.:  93.4 msec
P Vein S/D Ratio:  1.31 
 
Tricuspid Valve
 RAP Estimate:  5.00 mmHg
 
Left Ventricle
The left ventricle is normal size. There is normal LV segmental wall 
motion. Mild concentric left ventricular hypertrophy. The left 
ventricular systolic function is normal. LVEF is 60-65%. Mild 
diastolic dysfunction is present (impaired relaxation 
pattern).
 
Right Ventricle
The right ventricle is normal size. The right ventricular systolic 
function is normal.
 
Atria
The left atrium size is normal. The right atrium size is 
normal.
 
Aortic Valve
Aortic valve is mildly calcified. No aortic regurgitation is present. 
There is no aortic valvular stenosis.
 
Mitral Valve
The mitral valve is normal in structure. Mild mitral annular 
 
 
[facility]
[address]
[place]  [address]
Phone:  [phone]                    2 D/M-MODE ECHOCARDIOGRAM     
_______________________________________________________________________________
 
Name:            JOHN ADRIAN Cary               Room #:        213-P       ADM [place]
M.R.#:           [id number]          Account #:     [id number]  
Admission:       06/10/20         Attend Phys:   Issac Castillo MD
Discharge:                  Date of Birth: [birth year]  
                         Report #:      [id number]
        [id number]
_______________________________________________________________________________
calcification. There is no mitral valve regurgitation noted. No 
evidence of mitral valve stenosis.
 
Tricuspid Valve
The tricuspid valve is normal in structure. There is no tricuspid 
valve regurgitation noted. Unable to assess PA pressure.
 
Pulmonic Valve
Pulmonic valve is not well visualized.
 
Great Vessels
The aortic root is normal in size. Ascending aorta is not well 
visualized. IVC is normal in size and collapses >50% with 
inspiration.
 
Pericardium
There is no pericardial effusion.
 
<Conclusion>
The left ventricle is normal size.
Mild concentric left ventricular hypertrophy.
The left ventricular systolic function is normal.
Mild diastolic dysfunction is present (impaired relaxation 
pattern).
The right ventricle is normal size.
The left atrium size is normal.
Aortic valve is mildly calcified.
Mild mitral annular calcification.
There is no tricuspid valve regurgitation noted.
 
 
 
 
 
 
 
 
 
 
 
 
 
 
 
  <ELECTRONICALLY SIGNED>
   By: Ronny George MD               
  20     1016
D: 20 1016                           _____________________________________
T: 20 1016                           Ronny George MD                 /INF

## 2020-06-12 VITALS — DIASTOLIC BLOOD PRESSURE: 81 MMHG | SYSTOLIC BLOOD PRESSURE: 151 MMHG

## 2020-06-12 VITALS — DIASTOLIC BLOOD PRESSURE: 81 MMHG | SYSTOLIC BLOOD PRESSURE: 144 MMHG

## 2020-06-12 VITALS — DIASTOLIC BLOOD PRESSURE: 64 MMHG | SYSTOLIC BLOOD PRESSURE: 129 MMHG

## 2020-06-12 VITALS — SYSTOLIC BLOOD PRESSURE: 151 MMHG | DIASTOLIC BLOOD PRESSURE: 81 MMHG

## 2020-06-12 VITALS — DIASTOLIC BLOOD PRESSURE: 77 MMHG | SYSTOLIC BLOOD PRESSURE: 137 MMHG

## 2020-06-12 LAB
EST. AVERAGE GLUCOSE BLD GHB EST-MCNC: 226 MG/DL
GLYCOHEMOGLOBIN (HGB A1C): 9.5 % (ref 4.8–5.6)

## 2020-06-12 NOTE — NUR
Pt indicates that she has hh services through [facility]
[phone]. They will need to be notified at dc and hh orders (dc
summary and instructions)faxed to [phone]. She has had both the Nursing
as well as the homemaker through [place] medicaid. Possible dc this weekend.

## 2020-06-12 NOTE — NUR
RECEIVED PT'S CARE AROUND 0720; PT. ON BED; AOX4; DURING AM ASSESSMENT C/O
BACK PAIN; PRN PAIN MEDICATION GIVEN WITH AM MEDICATIONS; REMAINED ABOUT FALL
PREVENTIONS & CALLING BEFORE GETTING UP FROM BED; ST. UNDERSTANDING;
FORGETFUL; NEEDS TO BE REMAINED ABOUT IT THROUGH THE DAY; NOT ABLE TO DO
SLIDING SCALE DURING BREAKFAST & DINNER DUE TO BS NO CHECKED BEFORE EATING
MEAL; EDUCATED ABOUT CALLING BEFORE EATING; FORGETFUL REMAINED ABOUT IT; O2
TITRATE TO 4L; 94% 02 SAT; ABLE TO TAKE A SHOWER WITH OT; ELEVATED BP;
PHYSICIAN NOTIFIED DURING ROUNDINGS; ORDERS ON PLACED; ASSESSMENT AS CHARGED;
FOLLOWING POC; PASSED ON REPORT;

## 2020-06-12 NOTE — NUR
PT ALERT AND ORIENTED. VSS.  DENIES CHEST PAIN, NAUSEA OR VOMITING.  MINIMAL
BACK PAIN REPORTED TONIGHT.  INDEPENDENT IN HER ROOM.  SR AND ST WITH
ACTIVITIES.  PT HOPES TO BE DC'D TODAY. REPORTS BREATHING MUCH BETTER.  NO
OTHER CONCERNS.  WILL CONTINUE WITH CURRENT POC.

## 2020-06-13 VITALS — SYSTOLIC BLOOD PRESSURE: 125 MMHG | DIASTOLIC BLOOD PRESSURE: 79 MMHG

## 2020-06-13 VITALS — SYSTOLIC BLOOD PRESSURE: 123 MMHG | DIASTOLIC BLOOD PRESSURE: 61 MMHG

## 2020-06-13 VITALS — SYSTOLIC BLOOD PRESSURE: 151 MMHG | DIASTOLIC BLOOD PRESSURE: 92 MMHG

## 2020-06-13 NOTE — NUR
ASSUMED CARE AT SHIFT CHANGE, ALERT AND ORIENTED X4. VSS AND DENIES ANY
DISCOMFORT.
BG COVERED WITH INSULIN AS PRESCRIBED, AND WILL CONTINUE WITH POC.

## 2020-06-13 NOTE — NUR
ASSESSMENTS AS CHARTED, MEDS GIVEN AS CHARTED. PATIENT ALERT AND ORIENTED
DURING SHIFT. PATIENT SLEPT WELL DURING SHIFT, RECEIVED ANTIBIOTICS IV. FALL
PRECAUTIONS IN PLACE DURING SHIFT. STATED SHE HAD CONSTANT PAIN, BUT DID NOT
NEED ANY PAIN MEDS.

## 2021-10-26 ENCOUNTER — HOSPITAL ENCOUNTER (EMERGENCY)
Dept: HOSPITAL 96 - M.ERS | Age: 71
Discharge: ANOTHER HOSPITAL | End: 2021-10-26
Payer: MEDICARE

## 2021-10-26 VITALS — DIASTOLIC BLOOD PRESSURE: 54 MMHG | SYSTOLIC BLOOD PRESSURE: 119 MMHG

## 2021-10-26 VITALS — WEIGHT: 200 LBS | HEIGHT: 66 IN | BODY MASS INDEX: 32.14 KG/M2

## 2021-10-26 DIAGNOSIS — Z79.82: ICD-10-CM

## 2021-10-26 DIAGNOSIS — I63.9: Primary | ICD-10-CM

## 2021-10-26 DIAGNOSIS — Z20.822: ICD-10-CM

## 2021-10-26 DIAGNOSIS — I11.0: ICD-10-CM

## 2021-10-26 DIAGNOSIS — Z87.891: ICD-10-CM

## 2021-10-26 DIAGNOSIS — E03.9: ICD-10-CM

## 2021-10-26 DIAGNOSIS — R53.1: ICD-10-CM

## 2021-10-26 DIAGNOSIS — Z79.4: ICD-10-CM

## 2021-10-26 DIAGNOSIS — I50.9: ICD-10-CM

## 2021-10-26 DIAGNOSIS — F17.210: ICD-10-CM

## 2021-10-26 DIAGNOSIS — Z79.899: ICD-10-CM

## 2021-10-26 DIAGNOSIS — E78.00: ICD-10-CM

## 2021-10-26 DIAGNOSIS — J44.9: ICD-10-CM

## 2021-10-26 DIAGNOSIS — E11.9: ICD-10-CM

## 2021-10-26 LAB
ABSOLUTE BASOPHILS: 0.1 THOU/UL (ref 0–0.2)
ABSOLUTE EOSINOPHILS: 0.4 THOU/UL (ref 0–0.7)
ABSOLUTE MONOCYTES: 0.5 THOU/UL (ref 0–1.2)
ALBUMIN SERPL-MCNC: 3 G/DL (ref 3.4–5)
ALP SERPL-CCNC: 202 U/L (ref 46–116)
ALT SERPL-CCNC: 25 U/L (ref 30–65)
ANION GAP SERPL CALC-SCNC: 4 MMOL/L (ref 7–16)
AST SERPL-CCNC: 14.4 U/L (ref 15–37)
BACTERIA-REFLEX: (no result) /HPF
BASOPHILS NFR BLD AUTO: 0.7 %
BILIRUB SERPL-MCNC: 0.2 MG/DL
BILIRUB UR-MCNC: NEGATIVE MG/DL
BUN SERPL-MCNC: 13 MG/DL (ref 7–18)
CALCIUM SERPL-MCNC: 8.6 MG/DL (ref 8.5–10.1)
CHLORIDE SERPL-SCNC: 100 MMOL/L (ref 98–107)
CO2 SERPL-SCNC: 34 MMOL/L (ref 21–32)
COLOR UR: YELLOW
CREAT SERPL-MCNC: 1 MG/DL (ref 0.6–1.3)
EOSINOPHIL NFR BLD: 4.8 %
GLUCOSE SERPL-MCNC: 275 MG/DL (ref 70–99)
GRANULOCYTES NFR BLD MANUAL: 69.7 %
HCT VFR BLD CALC: 37.2 % (ref 37–47)
HGB BLD-MCNC: 12.4 GM/DL (ref 12–15)
KETONES UR STRIP-MCNC: NEGATIVE MG/DL
LYMPHOCYTES # BLD: 1.6 THOU/UL (ref 0.8–5.3)
LYMPHOCYTES NFR BLD AUTO: 18.7 %
MCH RBC QN AUTO: 30.2 PG (ref 26–34)
MCHC RBC AUTO-ENTMCNC: 33.5 G/DL (ref 28–37)
MCV RBC: 90.2 FL (ref 80–100)
MONOCYTES NFR BLD: 6.1 %
MPV: 7.3 FL. (ref 7.2–11.1)
NEUTROPHILS # BLD: 6 THOU/UL (ref 1.6–8.1)
NT-PRO BRAIN NAT PEPTIDE: 93 PG/ML (ref ?–300)
NUCLEATED RBCS: 0 /100WBC
PLATELET COUNT*: 280 THOU/UL (ref 150–400)
POTASSIUM SERPL-SCNC: 4.2 MMOL/L (ref 3.5–5.1)
PROT SERPL-MCNC: 6.8 G/DL (ref 6.4–8.2)
PROT UR QL STRIP: NEGATIVE
RBC # BLD AUTO: 4.12 MIL/UL (ref 4.2–5)
RBC # UR STRIP: NEGATIVE /UL
RBC #/AREA URNS HPF: (no result) /HPF (ref 0–2)
RDW-CV: 16.1 % (ref 10.5–14.5)
SODIUM SERPL-SCNC: 138 MMOL/L (ref 136–145)
SP GR UR STRIP: 1.02 (ref 1–1.03)
SQUAMOUS: (no result) /LPF (ref 0–3)
URINE CLARITY: CLEAR
URINE GLUCOSE-RANDOM: (no result)
URINE LEUKOCYTES-REFLEX: NEGATIVE
URINE NITRITE-REFLEX: POSITIVE
URINE WBC-REFLEX: (no result) /HPF (ref 0–5)
UROBILINOGEN UR STRIP-ACNC: 0.2 E.U./DL (ref 0.2–1)
WBC # BLD AUTO: 8.5 THOU/UL (ref 4–11)

## 2021-10-26 NOTE — EKG
[place] [address]
Phone:  [phone]                     ELECTROCARDIOGRAM REPORT      
_______________________________________________________________________________
 
Name:         WOODY MCNEAL               Room:                     [facility]    [id number]     Account #:     [id number]  
Admission:    10/26/21    Attend Phys:                     
Discharge:    10/26/21    Date of Birth: [birth year]  
Date of Service: 10/26/21 1152  Report #:      [id number]
        [id number]
_______________________________________________________________________________
THIS REPORT FOR:  //name//                      
 
                         [facility] ED
                                       
Test Date:    2021-10-26               Test Time:    11:52:11
Pat Name:     WOODY MCNEAL              Department:   
Patient ID:   [id number]            Room:          
Gender:       F                        Technician:   
:          1950               Requested By: Moo Broussard
Order Number: [id number] MD:   Manish Garibay
                                 Measurements
Intervals                              Axis          
Rate:         95                       P:            54
AL:           141                      QRS:          33
QRSD:         85                       T:            51
QT:           336                                    
QTc:          423                                    
                           Interpretive Statements
Sinus rhythm
low voltage
Probable left atrial enlargement
Baseline wander in lead(s) II,III,aVF
No previous ECG available for comparison
Electronically Signed On 10- 15:03:05 CDT by Manish Garibay
[email or web address]
 
 
 
 
 
 
 
 
 
 
 
 
 
 
 
 
 
 
 
  <ELECTRONICALLY SIGNED>
                                           By: Manish Garibay MD, [facility]      
  10/26/21     1503
D: 10/26/21 1152   _____________________________________
T: 10/26/21 1152   Manish Garibay MD, [facility]        /EPI

## 2022-01-03 ENCOUNTER — HOSPITAL ENCOUNTER (EMERGENCY)
Dept: HOSPITAL 96 - M.ERS | Age: 72
Discharge: HOME | End: 2022-01-03
Payer: MEDICARE

## 2022-01-03 VITALS — BODY MASS INDEX: 32.14 KG/M2 | HEIGHT: 66 IN | WEIGHT: 200 LBS

## 2022-01-03 VITALS — DIASTOLIC BLOOD PRESSURE: 66 MMHG | SYSTOLIC BLOOD PRESSURE: 140 MMHG

## 2022-01-03 DIAGNOSIS — E11.9: ICD-10-CM

## 2022-01-03 DIAGNOSIS — Z79.4: ICD-10-CM

## 2022-01-03 DIAGNOSIS — E78.00: ICD-10-CM
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
## 2022-01-03 DIAGNOSIS — M19.90: ICD-10-CM

## 2022-01-03 DIAGNOSIS — E03.9: ICD-10-CM

## 2022-01-03 DIAGNOSIS — Z86.711: ICD-10-CM

## 2022-01-03 DIAGNOSIS — J44.9: ICD-10-CM

## 2022-01-03 DIAGNOSIS — Z87.891: ICD-10-CM

## 2022-01-03 DIAGNOSIS — L08.9: ICD-10-CM

## 2022-01-03 DIAGNOSIS — I11.0: ICD-10-CM

## 2022-01-03 DIAGNOSIS — Z91.09: ICD-10-CM

## 2022-01-03 DIAGNOSIS — Z79.899: ICD-10-CM

## 2022-01-03 DIAGNOSIS — S80.812A: Primary | ICD-10-CM
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
## 2022-01-03 DIAGNOSIS — I50.9: ICD-10-CM
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
## 2022-01-03 DIAGNOSIS — Z79.82: ICD-10-CM
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
## 2022-01-03 DIAGNOSIS — L03.116: ICD-10-CM

## 2022-01-12 ENCOUNTER — HOSPITAL ENCOUNTER (INPATIENT)
Dept: HOSPITAL 96 - M.ERS | Age: 72
LOS: 2 days | Discharge: HOME HEALTH | DRG: 189 | End: 2022-01-14
Attending: STUDENT IN AN ORGANIZED HEALTH CARE EDUCATION/TRAINING PROGRAM | Admitting: STUDENT IN AN ORGANIZED HEALTH CARE EDUCATION/TRAINING PROGRAM
Payer: MEDICARE

## 2022-01-12 VITALS — DIASTOLIC BLOOD PRESSURE: 68 MMHG | SYSTOLIC BLOOD PRESSURE: 152 MMHG

## 2022-01-12 VITALS — DIASTOLIC BLOOD PRESSURE: 75 MMHG | SYSTOLIC BLOOD PRESSURE: 174 MMHG

## 2022-01-12 VITALS — BODY MASS INDEX: 33.11 KG/M2 | WEIGHT: 206 LBS | HEIGHT: 65.98 IN

## 2022-01-12 VITALS — DIASTOLIC BLOOD PRESSURE: 88 MMHG | SYSTOLIC BLOOD PRESSURE: 118 MMHG

## 2022-01-12 DIAGNOSIS — N18.30: ICD-10-CM

## 2022-01-12 DIAGNOSIS — Z20.822: ICD-10-CM

## 2022-01-12 DIAGNOSIS — I13.0: ICD-10-CM

## 2022-01-12 DIAGNOSIS — E11.65: ICD-10-CM

## 2022-01-12 DIAGNOSIS — J96.02: ICD-10-CM

## 2022-01-12 DIAGNOSIS — E78.00: ICD-10-CM

## 2022-01-12 DIAGNOSIS — J44.1: ICD-10-CM

## 2022-01-12 DIAGNOSIS — M54.9: ICD-10-CM

## 2022-01-12 DIAGNOSIS — D32.0: ICD-10-CM

## 2022-01-12 DIAGNOSIS — E78.5: ICD-10-CM

## 2022-01-12 DIAGNOSIS — J96.01: Primary | ICD-10-CM

## 2022-01-12 DIAGNOSIS — Z79.4: ICD-10-CM

## 2022-01-12 DIAGNOSIS — I50.9: ICD-10-CM

## 2022-01-12 DIAGNOSIS — G89.29: ICD-10-CM

## 2022-01-12 DIAGNOSIS — Z87.891: ICD-10-CM

## 2022-01-12 DIAGNOSIS — E03.9: ICD-10-CM

## 2022-01-12 DIAGNOSIS — Z82.49: ICD-10-CM

## 2022-01-12 DIAGNOSIS — Z86.711: ICD-10-CM

## 2022-01-12 DIAGNOSIS — E11.22: ICD-10-CM

## 2022-01-12 DIAGNOSIS — I71.4: ICD-10-CM

## 2022-01-12 DIAGNOSIS — Z88.8: ICD-10-CM

## 2022-01-12 LAB
ABSOLUTE BASOPHILS: 0.1 THOU/UL (ref 0–0.2)
ABSOLUTE EOSINOPHILS: 0.3 THOU/UL (ref 0–0.7)
ABSOLUTE MONOCYTES: 0.5 THOU/UL (ref 0–1.2)
ALBUMIN SERPL-MCNC: 3 G/DL (ref 3.4–5)
ALP SERPL-CCNC: 170 U/L (ref 46–116)
ALT SERPL-CCNC: 31 U/L (ref 30–65)
ANION GAP SERPL CALC-SCNC: 3 MMOL/L (ref 7–16)
AST SERPL-CCNC: 18 U/L (ref 15–37)
BASOPHILS NFR BLD AUTO: 0.6 %
BE: 5.9 MMOL/L
BILIRUB SERPL-MCNC: 0.3 MG/DL
BUN SERPL-MCNC: 9 MG/DL (ref 7–18)
CALCIUM SERPL-MCNC: 8.4 MG/DL (ref 8.5–10.1)
CHLORIDE SERPL-SCNC: 101 MMOL/L (ref 98–107)
CO2 SERPL-SCNC: 36 MMOL/L (ref 21–32)
CREAT SERPL-MCNC: 1 MG/DL (ref 0.6–1.3)
EOSINOPHIL NFR BLD: 3.1 %
GLUCOSE SERPL-MCNC: 255 MG/DL (ref 70–99)
GRANULOCYTES NFR BLD MANUAL: 74.6 %
HCT VFR BLD CALC: 37.4 % (ref 37–47)
HGB BLD-MCNC: 12.3 GM/DL (ref 12–15)
LYMPHOCYTES # BLD: 1.3 THOU/UL (ref 0.8–5.3)
LYMPHOCYTES NFR BLD AUTO: 16 %
MCH RBC QN AUTO: 29.6 PG (ref 26–34)
MCHC RBC AUTO-ENTMCNC: 32.9 G/DL (ref 28–37)
MCV RBC: 89.9 FL (ref 80–100)
MONOCYTES NFR BLD: 5.7 %
MPV: 7.4 FL. (ref 7.2–11.1)
NEUTROPHILS # BLD: 6.3 THOU/UL (ref 1.6–8.1)
NUCLEATED RBCS: 0 /100WBC
PCO2 BLD: 58.7 MMHG (ref 35–45)
PLATELET COUNT*: 225 THOU/UL (ref 150–400)
PO2 BLD: 50.6 MMHG (ref 75–100)
POTASSIUM SERPL-SCNC: 4.2 MMOL/L (ref 3.5–5.1)
PROT SERPL-MCNC: 6.6 G/DL (ref 6.4–8.2)
RBC # BLD AUTO: 4.16 MIL/UL (ref 4.2–5)
RDW-CV: 14.7 % (ref 10.5–14.5)
SODIUM SERPL-SCNC: 140 MMOL/L (ref 136–145)
WBC # BLD AUTO: 8.4 THOU/UL (ref 4–11)

## 2022-01-12 NOTE — EKG
[place] [address]
Phone:  [phone]                     ELECTROCARDIOGRAM REPORT      
_______________________________________________________________________________
 
Name:         WOODY MCNEAL               Room:                     [facility] 
M.R.#:    [id number]     Account #:     [id number]  
Admission:    22    Attend Phys:                     
Discharge:                Date of Birth: [birth year]  
Date of Service: 22  Report #:      [id number]
        [id number]
_______________________________________________________________________________
THIS REPORT FOR:  //name//                      
 
                         [facility] ED
                                       
Test Date:    2022               Test Time:    06:24:06
Pat Name:     WOODY MCNEAL              Department:   
Patient ID:   [id number]            Room:          
Gender:                               Technician:   
:          1950               Requested By: Almaz Amos
Order Number: [id number] MD:   Abdoulaye Sutton
                                 Measurements
Intervals                              Axis          
Rate:         112                      P:            69
TN:           141                      QRS:          41
QRSD:         86                       T:            72
QT:           332                                    
QTc:          453                                    
                           Interpretive Statements
Sinus tachycardia
Probable left atrial enlargement
Low voltage, precordial leads
Compared to ECG 10/26/2021 11:52:11
Sinus rate has increased
Electronically Signed On 2022 9:29:23 CST by Abdoulaye Sutton
[email or web address]
 
 
 
 
 
 
 
 
 
 
 
 
 
 
 
 
 
 
 
  <ELECTRONICALLY SIGNED>
                                           By: Abdoulaye Sutton MD, [facility]     
  22     0929
D: 22 0624   _____________________________________
T: 2224   Abdoulaye Sutton MD, [facility]       /EPI

## 2022-01-13 VITALS — SYSTOLIC BLOOD PRESSURE: 143 MMHG | DIASTOLIC BLOOD PRESSURE: 69 MMHG

## 2022-01-13 VITALS — DIASTOLIC BLOOD PRESSURE: 52 MMHG | SYSTOLIC BLOOD PRESSURE: 121 MMHG

## 2022-01-13 VITALS — DIASTOLIC BLOOD PRESSURE: 65 MMHG | SYSTOLIC BLOOD PRESSURE: 120 MMHG

## 2022-01-13 VITALS — SYSTOLIC BLOOD PRESSURE: 130 MMHG | DIASTOLIC BLOOD PRESSURE: 68 MMHG

## 2022-01-13 VITALS — DIASTOLIC BLOOD PRESSURE: 75 MMHG | SYSTOLIC BLOOD PRESSURE: 174 MMHG

## 2022-01-13 LAB
ABSOLUTE MONOCYTES: 0.1 THOU/UL (ref 0–1.2)
ALBUMIN SERPL-MCNC: 3.1 G/DL (ref 3.4–5)
ALP SERPL-CCNC: 183 U/L (ref 46–116)
ALT SERPL-CCNC: 31 U/L (ref 30–65)
ANION GAP SERPL CALC-SCNC: 11 MMOL/L (ref 7–16)
AST SERPL-CCNC: 12 U/L (ref 15–37)
BE: 5 MMOL/L
BILIRUB SERPL-MCNC: 0.3 MG/DL
BUN SERPL-MCNC: 20 MG/DL (ref 7–18)
CALCIUM SERPL-MCNC: 8.8 MG/DL (ref 8.5–10.1)
CHLORIDE SERPL-SCNC: 94 MMOL/L (ref 98–107)
CO2 SERPL-SCNC: 28 MMOL/L (ref 21–32)
CREAT SERPL-MCNC: 1.4 MG/DL (ref 0.6–1.3)
GLUCOSE SERPL-MCNC: 611 MG/DL (ref 70–99)
GRANULOCYTES NFR BLD MANUAL: 90 %
HCT VFR BLD CALC: 40 % (ref 37–47)
HGB BLD-MCNC: 12.8 GM/DL (ref 12–15)
LYMPHOCYTES # BLD: 0.5 THOU/UL (ref 0.8–5.3)
LYMPHOCYTES NFR BLD AUTO: 6 %
MAGNESIUM SERPL-MCNC: 2.1 MG/DL (ref 1.8–2.4)
MCH RBC QN AUTO: 29.7 PG (ref 26–34)
MCHC RBC AUTO-ENTMCNC: 31.9 G/DL (ref 28–37)
MCV RBC: 92.9 FL (ref 80–100)
MONOCYTES NFR BLD: 1 %
MPV: 7.8 FL. (ref 7.2–11.1)
NEUTROPHILS # BLD: 7.7 THOU/UL (ref 1.6–8.1)
NEUTS BAND NFR BLD: 3 %
NUCLEATED RBCS: 0 /100WBC
PCO2 BLD: 53.1 MMHG (ref 35–45)
PLATELET # BLD EST: ADEQUATE 10*3/UL
PLATELET COUNT*: 235 THOU/UL (ref 150–400)
PO2 BLD: 71.9 MMHG (ref 75–100)
POTASSIUM SERPL-SCNC: 4.6 MMOL/L (ref 3.5–5.1)
PROT SERPL-MCNC: 7.1 G/DL (ref 6.4–8.2)
RBC # BLD AUTO: 4.31 MIL/UL (ref 4.2–5)
RBC MORPH BLD: NORMAL
RDW-CV: 14.9 % (ref 10.5–14.5)
SODIUM SERPL-SCNC: 133 MMOL/L (ref 136–145)
WBC # BLD AUTO: 8.3 THOU/UL (ref 4–11)

## 2022-01-14 VITALS — SYSTOLIC BLOOD PRESSURE: 134 MMHG | DIASTOLIC BLOOD PRESSURE: 72 MMHG

## 2022-01-14 VITALS — DIASTOLIC BLOOD PRESSURE: 81 MMHG | SYSTOLIC BLOOD PRESSURE: 122 MMHG

## 2022-01-14 VITALS — DIASTOLIC BLOOD PRESSURE: 90 MMHG | SYSTOLIC BLOOD PRESSURE: 153 MMHG

## 2022-01-14 LAB
ABSOLUTE BASOPHILS: 0 THOU/UL (ref 0–0.2)
ABSOLUTE EOSINOPHILS: 0 THOU/UL (ref 0–0.7)
ABSOLUTE MONOCYTES: 0.3 THOU/UL (ref 0–1.2)
ALBUMIN SERPL-MCNC: 3 G/DL (ref 3.4–5)
ALP SERPL-CCNC: 149 U/L (ref 46–116)
ALT SERPL-CCNC: 25 U/L (ref 30–65)
ANION GAP SERPL CALC-SCNC: 4 MMOL/L (ref 7–16)
AST SERPL-CCNC: 17 U/L (ref 15–37)
BASOPHILS NFR BLD AUTO: 0.1 %
BILIRUB SERPL-MCNC: 0.2 MG/DL
BUN SERPL-MCNC: 26 MG/DL (ref 7–18)
CALCIUM SERPL-MCNC: 9 MG/DL (ref 8.5–10.1)
CHLORIDE SERPL-SCNC: 102 MMOL/L (ref 98–107)
CO2 SERPL-SCNC: 34 MMOL/L (ref 21–32)
CREAT SERPL-MCNC: 1 MG/DL (ref 0.6–1.3)
EOSINOPHIL NFR BLD: 0 %
GLUCOSE SERPL-MCNC: 140 MG/DL (ref 70–99)
GRANULOCYTES NFR BLD MANUAL: 91.7 %
HCT VFR BLD CALC: 39.2 % (ref 37–47)
HGB BLD-MCNC: 12.5 GM/DL (ref 12–15)
LYMPHOCYTES # BLD: 0.8 THOU/UL (ref 0.8–5.3)
LYMPHOCYTES NFR BLD AUTO: 6.1 %
MAGNESIUM SERPL-MCNC: 2.2 MG/DL (ref 1.8–2.4)
MCH RBC QN AUTO: 29 PG (ref 26–34)
MCHC RBC AUTO-ENTMCNC: 31.8 G/DL (ref 28–37)
MCV RBC: 91.2 FL (ref 80–100)
MONOCYTES NFR BLD: 2.1 %
MPV: 7.6 FL. (ref 7.2–11.1)
NEUTROPHILS # BLD: 11.9 THOU/UL (ref 1.6–8.1)
NT-PRO BRAIN NAT PEPTIDE: 314 PG/ML (ref ?–300)
NUCLEATED RBCS: 0 /100WBC
PHOSPHATE SERPL-MCNC: 2.9 MG/DL (ref 2.5–4.9)
PLATELET COUNT*: 264 THOU/UL (ref 150–400)
POTASSIUM SERPL-SCNC: 4.3 MMOL/L (ref 3.5–5.1)
PROT SERPL-MCNC: 6.4 G/DL (ref 6.4–8.2)
RBC # BLD AUTO: 4.3 MIL/UL (ref 4.2–5)
RDW-CV: 14.9 % (ref 10.5–14.5)
SODIUM SERPL-SCNC: 140 MMOL/L (ref 136–145)
WBC # BLD AUTO: 12.9 THOU/UL (ref 4–11)

## 2022-01-15 LAB
EST. AVERAGE GLUCOSE BLD GHB EST-MCNC: 243 MG/DL
GLYCOHEMOGLOBIN (HGB A1C): 10.1 % (ref 4.8–5.6)

## 2022-02-10 ENCOUNTER — HOSPITAL ENCOUNTER (INPATIENT)
Dept: HOSPITAL 96 - M.ERS | Age: 72
LOS: 4 days | Discharge: HOME HEALTH | DRG: 189 | End: 2022-02-14
Attending: INTERNAL MEDICINE | Admitting: INTERNAL MEDICINE
Payer: MEDICARE

## 2022-02-10 VITALS — SYSTOLIC BLOOD PRESSURE: 146 MMHG | DIASTOLIC BLOOD PRESSURE: 89 MMHG

## 2022-02-10 VITALS — DIASTOLIC BLOOD PRESSURE: 90 MMHG | SYSTOLIC BLOOD PRESSURE: 171 MMHG

## 2022-02-10 VITALS — HEIGHT: 65.98 IN | BODY MASS INDEX: 33.11 KG/M2 | WEIGHT: 206 LBS

## 2022-02-10 VITALS — DIASTOLIC BLOOD PRESSURE: 89 MMHG | SYSTOLIC BLOOD PRESSURE: 146 MMHG

## 2022-02-10 DIAGNOSIS — E78.00: ICD-10-CM

## 2022-02-10 DIAGNOSIS — J96.02: Primary | ICD-10-CM

## 2022-02-10 DIAGNOSIS — I50.9: ICD-10-CM

## 2022-02-10 DIAGNOSIS — I11.0: ICD-10-CM

## 2022-02-10 DIAGNOSIS — E11.65: ICD-10-CM

## 2022-02-10 DIAGNOSIS — G47.33: ICD-10-CM

## 2022-02-10 DIAGNOSIS — E03.9: ICD-10-CM

## 2022-02-10 DIAGNOSIS — E66.9: ICD-10-CM

## 2022-02-10 DIAGNOSIS — Z86.711: ICD-10-CM

## 2022-02-10 DIAGNOSIS — Z20.822: ICD-10-CM

## 2022-02-10 DIAGNOSIS — Z79.4: ICD-10-CM

## 2022-02-10 DIAGNOSIS — M54.10: ICD-10-CM

## 2022-02-10 DIAGNOSIS — E44.1: ICD-10-CM

## 2022-02-10 DIAGNOSIS — M54.9: ICD-10-CM

## 2022-02-10 DIAGNOSIS — J96.01: ICD-10-CM

## 2022-02-10 DIAGNOSIS — G89.29: ICD-10-CM

## 2022-02-10 DIAGNOSIS — Z87.891: ICD-10-CM

## 2022-02-10 DIAGNOSIS — N30.00: ICD-10-CM

## 2022-02-10 DIAGNOSIS — J44.1: ICD-10-CM

## 2022-02-10 LAB
ABSOLUTE BASOPHILS: 0 THOU/UL (ref 0–0.2)
ABSOLUTE EOSINOPHILS: 0.1 THOU/UL (ref 0–0.7)
ABSOLUTE MONOCYTES: 0.3 THOU/UL (ref 0–1.2)
ALBUMIN SERPL-MCNC: 3 G/DL (ref 3.4–5)
ALP SERPL-CCNC: 145 U/L (ref 46–116)
ALT SERPL-CCNC: 30 U/L (ref 30–65)
ANION GAP SERPL CALC-SCNC: 5 MMOL/L (ref 7–16)
AST SERPL-CCNC: 18 U/L (ref 15–37)
BACTERIA-REFLEX: (no result) /HPF
BASOPHILS NFR BLD AUTO: 0.3 %
BILIRUB SERPL-MCNC: 0.2 MG/DL
BILIRUB UR-MCNC: NEGATIVE MG/DL
BUN SERPL-MCNC: 12 MG/DL (ref 7–18)
CALCIUM SERPL-MCNC: 8.3 MG/DL (ref 8.5–10.1)
CHLORIDE SERPL-SCNC: 101 MMOL/L (ref 98–107)
CO2 SERPL-SCNC: 37 MMOL/L (ref 21–32)
COLOR UR: YELLOW
CREAT SERPL-MCNC: 1.3 MG/DL (ref 0.6–1.3)
EOSINOPHIL NFR BLD: 2.2 %
GLUCOSE SERPL-MCNC: 291 MG/DL (ref 70–99)
GRANULOCYTES NFR BLD MANUAL: 73.6 %
HCT VFR BLD CALC: 36.5 % (ref 37–47)
HGB BLD-MCNC: 11.8 GM/DL (ref 12–15)
HYALINE CASTS #/AREA URNS LPF: (no result) /LPF
KETONES UR STRIP-MCNC: NEGATIVE MG/DL
LYMPHOCYTES # BLD: 1.2 THOU/UL (ref 0.8–5.3)
LYMPHOCYTES NFR BLD AUTO: 18.7 %
MAGNESIUM SERPL-MCNC: 1.8 MG/DL (ref 1.8–2.4)
MCH RBC QN AUTO: 29.3 PG (ref 26–34)
MCHC RBC AUTO-ENTMCNC: 32.3 G/DL (ref 28–37)
MCV RBC: 90.7 FL (ref 80–100)
MONOCYTES NFR BLD: 5.2 %
MPV: 7.4 FL. (ref 7.2–11.1)
NEUTROPHILS # BLD: 4.9 THOU/UL (ref 1.6–8.1)
NT-PRO BRAIN NAT PEPTIDE: 118 PG/ML (ref ?–300)
NUCLEATED RBCS: 0 /100WBC
PLATELET COUNT*: 218 THOU/UL (ref 150–400)
POTASSIUM SERPL-SCNC: 4 MMOL/L (ref 3.5–5.1)
PROT SERPL-MCNC: 6 G/DL (ref 6.4–8.2)
PROT UR QL STRIP: NEGATIVE
RBC # BLD AUTO: 4.03 MIL/UL (ref 4.2–5)
RBC # UR STRIP: NEGATIVE /UL
RDW-CV: 15.2 % (ref 10.5–14.5)
SODIUM SERPL-SCNC: 143 MMOL/L (ref 136–145)
SP GR UR STRIP: >= 1.03 (ref 1–1.03)
SQUAMOUS: (no result) /LPF (ref 0–3)
URINE CLARITY: (no result)
URINE GLUCOSE-RANDOM: (no result)
URINE LEUKOCYTES-REFLEX: (no result)
URINE NITRITE-REFLEX: POSITIVE
UROBILINOGEN UR STRIP-ACNC: 0.2 E.U./DL (ref 0.2–1)
WBC # BLD AUTO: 6.7 THOU/UL (ref 4–11)

## 2022-02-11 VITALS — SYSTOLIC BLOOD PRESSURE: 164 MMHG | DIASTOLIC BLOOD PRESSURE: 95 MMHG

## 2022-02-11 VITALS — DIASTOLIC BLOOD PRESSURE: 71 MMHG | SYSTOLIC BLOOD PRESSURE: 120 MMHG

## 2022-02-11 VITALS — DIASTOLIC BLOOD PRESSURE: 104 MMHG | SYSTOLIC BLOOD PRESSURE: 180 MMHG

## 2022-02-11 VITALS — DIASTOLIC BLOOD PRESSURE: 76 MMHG | SYSTOLIC BLOOD PRESSURE: 149 MMHG

## 2022-02-11 VITALS — SYSTOLIC BLOOD PRESSURE: 177 MMHG | DIASTOLIC BLOOD PRESSURE: 92 MMHG

## 2022-02-11 VITALS — DIASTOLIC BLOOD PRESSURE: 80 MMHG | SYSTOLIC BLOOD PRESSURE: 130 MMHG

## 2022-02-11 LAB
ABSOLUTE MONOCYTES: 0.1 THOU/UL (ref 0–1.2)
ANION GAP SERPL CALC-SCNC: 6 MMOL/L (ref 7–16)
BUN SERPL-MCNC: 17 MG/DL (ref 7–18)
CALCIUM SERPL-MCNC: 8.7 MG/DL (ref 8.5–10.1)
CHLORIDE SERPL-SCNC: 96 MMOL/L (ref 98–107)
CO2 SERPL-SCNC: 34 MMOL/L (ref 21–32)
CREAT SERPL-MCNC: 1.2 MG/DL (ref 0.6–1.3)
GLUCOSE SERPL-MCNC: 465 MG/DL (ref 70–99)
GRANULOCYTES NFR BLD MANUAL: 84 %
HCT VFR BLD CALC: 38 % (ref 37–47)
HGB BLD-MCNC: 12.3 GM/DL (ref 12–15)
LYMPHOCYTES # BLD: 1.1 THOU/UL (ref 0.8–5.3)
LYMPHOCYTES NFR BLD AUTO: 12 %
MCH RBC QN AUTO: 29.4 PG (ref 26–34)
MCHC RBC AUTO-ENTMCNC: 32.5 G/DL (ref 28–37)
MCV RBC: 90.7 FL (ref 80–100)
MONOCYTES NFR BLD: 1 %
MPV: 7.8 FL. (ref 7.2–11.1)
NEUTROPHILS # BLD: 7.7 THOU/UL (ref 1.6–8.1)
NEUTS BAND NFR BLD: 3 %
NUCLEATED RBCS: 0 /100WBC
PLATELET # BLD EST: ADEQUATE 10*3/UL
PLATELET COUNT*: 256 THOU/UL (ref 150–400)
POTASSIUM SERPL-SCNC: 4.7 MMOL/L (ref 3.5–5.1)
RBC # BLD AUTO: 4.19 MIL/UL (ref 4.2–5)
RDW-CV: 14.8 % (ref 10.5–14.5)
SODIUM SERPL-SCNC: 136 MMOL/L (ref 136–145)
WBC # BLD AUTO: 8.9 THOU/UL (ref 4–11)

## 2022-02-11 NOTE — 2DMMODE
[place] [address]
Phone:  [phone] 2 D/M-MODE ECHOCARDIOGRAM     
_______________________________________________________________________________
 
Name:         WOODY MCNEAL QUETA               Room:          [address] [place] 
[facility]#:    [id number]     Account #:     [id number]  
Admission:    02/10/22    Attend Phys:   Pavel Nunez
Discharge:                Date of Birth: [birth year]  
Date of Service: 22 1520  Report #:      [id number]
        [id number]
_______________________________________________________________________________
THIS REPORT FOR:
 
cc:  Marleen Shankar Christine L. DO Holkins,Abdoulaye LEWIS MD [facility]       
                                                                       ~
 
--------------- APPROVED REPORT --------------
 
 
Study performed:  2022 09:07:45
 
EXAM: Comprehensive 2D, Doppler, and color-flow 
Echocardiogram 
Patient Location: In-Patient   
Room #:  [facility] [place]     Status:  routine
 
      BSA:         2.02
HR: 86 bpm BP:          164/95 mmHg 
Rhythm: NSR     
 
Other Information 
Study Quality: Good
 
Indications
Chest Pain
 
2D Dimensions
IVSd:  12.23 (7-11mm) LVOT Diam:  19.28 (18-24mm) 
LVDd:  51.23 mm  
PWd:  11.08 (7-11mm) Ascending Ao:  28.48 (22-36mm)
LVDs:  28.29 (25-40mm) 
Aortic Root:  28.94 mm 
 
Volumes
Left Atrial Volume (Systole) 
    LA ESV Index:  22.70 mL/m2
 
Aortic Valve
AoV Peak Hrarison.:  1.88 m/s 
AO Peak Gr.:  14.17 mmHg LVOT Max P.42 mmHg
AO Mean Gr.:  7.83 mmHg  LVOT Mean P.34 mmHg
    LVOT Max V:  1.16 m/s
AO V2 VTI:  38.96 cm  LVOT Mean V:  0.69 m/s
GUNNER (VTI):  1.96 cm2  LVOT V1 VTI:  26.19 cm
 
 
 
[place] [address]
Phone:  [phone]                     2 D/M-MODE ECHOCARDIOGRAM     
_______________________________________________________________________________
 
Name:         WOODY MCNEAL               Room:          [address] [place] 
..#:    [id number]     Account #:     [id number]  
Admission:    02/10/22    Attend Phys:   Pavel Nunez
Discharge:                Date of Birth: [birth year]  
Date of Service: 22 1520  Report #:      [id number]
        [id number]
_______________________________________________________________________________
Mitral Valve
    E/A Ratio:  0.86
    MV Decel. Time:  178.19 ms
MV E Max Harrison.:  1.12 m/s 
MV PHT:  51.67 ms  
MVA (PHT):  4.26 cm2  
 
TDI
E/Lateral E':  16.00 E/Medial E':  12.44
   Medial E' Harrison.:  0.09 m/s
   Lateral E' Harrison.:  0.07 m/s
 
Pulmonary Valve
PV Peak Harrison.:  0.94 m/s PV Peak Gr.:  3.52 mmHg
 
Left Ventricle
The left ventricle is normal size. There is normal LV segmental wall 
motion. There is normal left ventricular wall thickness. Left 
ventricular systolic function is normal. The left ventricular 
ejection fraction is within the normal range. LVEF is 60-65%. Grade I 
- abnormal relaxation pattern.
 
Right Ventricle
The right ventricle is normal size. The right ventricular systolic 
function is normal.
 
Atria
The left atrium size is normal. The right atrium size is 
normal.
 
Aortic Valve
Mild aortic valve sclerosis. No aortic regurgitation is present. No 
hemodynamically significant valvular aortic stenosis.
 
Mitral Valve
Mild mitral annular calcification. Trace mitral regurgitation. No 
evidence of mitral valve stenosis.
 
Tricuspid Valve
The tricuspid valve is normal in structure. Unable to assess PA 
pressure. Trace tricuspid regurgitation.
 
Pulmonic Valve
The pulmonary valve is normal in structure. There is no pulmonic 
valvular regurgitation.
 
 
 
[place] [address]
Phone:  [phone]                     2 D/M-MODE ECHOCARDIOGRAM     
_______________________________________________________________________________
 
Name:         MCNEALWOODY QUETA               Room:          [address] [place] 
[facility]#:    [id number]     Account #:     [id number]  
Admission:    02/10/22    Attend Phys:   Pavel Nunez
Discharge:                Date of Birth: [birth year]  
Date of Service: 22 1520  Report #:      [id number]
        [id number]
_______________________________________________________________________________
Great Vessels
The aortic root is normal in size. IVC is normal in size and 
collapses >50% with inspiration.
 
Pericardium
There is no pericardial effusion.
 
<Conclusion>
The left ventricle is normal size.
There is normal left ventricular wall thickness.
Left ventricular systolic function is normal.
The left ventricular ejection fraction is within the normal 
range.
LVEF is 60-65%.
Grade I - abnormal relaxation pattern.
The right ventricle is normal size.
The left atrium size is normal.
Mild aortic valve sclerosis.
No aortic regurgitation is present.
No hemodynamically significant valvular aortic stenosis.
Mild mitral annular calcification.
Trace mitral regurgitation.
The tricuspid valve is normal in structure.
IVC is normal in size and collapses >50% with inspiration.
There is no pericardial effusion.
There is normal LV segmental wall motion.
 
 
 
 
 
 
 
 
 
 
 
 
 
 
 
 
 
 
  <ELECTRONICALLY SIGNED>
                                           By: Abdoulaye Sutton MD, FACC     
  22     1520
D: 22 1520   _____________________________________
T: 22 1520   Abdoulaye Sutton MD, FACC       /INF

## 2022-02-11 NOTE — EKG
[place] [address]
Phone:  [phone]                     ELECTROCARDIOGRAM REPORT      
_______________________________________________________________________________
 
Name:         WOODY MCNEAL               Room:          [address]    ADM [place] 
.R.#:    [id number]     Account #:     [id number]  
Admission:    02/10/22    Attend Phys:   Pavel Nunez
Discharge:                Date of Birth: [birth year]  
Date of Service: 02/10/22 1505  Report #:      [id number]
        [id number]
_______________________________________________________________________________
THIS REPORT FOR:  //name//                      
 
                         [facility] ED
                                       
Test Date:    2022-02-10               Test Time:    15:05:29
Pat Name:     WOODY MCNEAL              Department:   
Patient ID:   [id number]            Room:         [facility]
Gender:       F                        Technician:   KF
:          1950               Requested By: Cuco Calvin
Order Number: [id number] MD:   Abdoulaye Sutton
                                 Measurements
Intervals                              Axis          
Rate:         84                       P:            69
KS:           148                      QRS:          19
QRSD:         88                       T:            60
QT:           359                                    
QTc:          425                                    
                           Interpretive Statements
Sinus rhythm
Low voltage, precordial leads
Compared to ECG 2022 06:24:06
Sinus tachycardia no longer present
Electronically Signed On 2022 11:24:14 CST by Abdoulaye Sutton
[email or web address]
 
 
 
 
 
 
 
 
 
 
 
 
 
 
 
 
 
 
 
 
  <ELECTRONICALLY SIGNED>
                                           By: Abdoulaye Sutton MD, [facility]     
  22     1124
D: 02/10/22 1505   _____________________________________
T: 02/10/22 1505   Abdoulaye Sutton MD, [facility]       /EPI

## 2022-02-12 VITALS — DIASTOLIC BLOOD PRESSURE: 60 MMHG | SYSTOLIC BLOOD PRESSURE: 116 MMHG

## 2022-02-12 VITALS — SYSTOLIC BLOOD PRESSURE: 133 MMHG | DIASTOLIC BLOOD PRESSURE: 68 MMHG

## 2022-02-12 VITALS — DIASTOLIC BLOOD PRESSURE: 81 MMHG | SYSTOLIC BLOOD PRESSURE: 138 MMHG

## 2022-02-12 VITALS — SYSTOLIC BLOOD PRESSURE: 148 MMHG | DIASTOLIC BLOOD PRESSURE: 85 MMHG

## 2022-02-12 VITALS — SYSTOLIC BLOOD PRESSURE: 118 MMHG | DIASTOLIC BLOOD PRESSURE: 89 MMHG

## 2022-02-12 VITALS — DIASTOLIC BLOOD PRESSURE: 66 MMHG | SYSTOLIC BLOOD PRESSURE: 125 MMHG

## 2022-02-12 LAB
ANION GAP SERPL CALC-SCNC: 2 MMOL/L (ref 7–16)
BUN SERPL-MCNC: 28 MG/DL (ref 7–18)
CALCIUM SERPL-MCNC: 9.2 MG/DL (ref 8.5–10.1)
CHLORIDE SERPL-SCNC: 95 MMOL/L (ref 98–107)
CO2 SERPL-SCNC: 38 MMOL/L (ref 21–32)
CREAT SERPL-MCNC: 1.1 MG/DL (ref 0.6–1.3)
GLUCOSE SERPL-MCNC: 359 MG/DL (ref 70–99)
POTASSIUM SERPL-SCNC: 4.9 MMOL/L (ref 3.5–5.1)
SODIUM SERPL-SCNC: 135 MMOL/L (ref 136–145)

## 2022-02-12 NOTE — EKG
[place] [address]
Phone:  [phone]                     ELECTROCARDIOGRAM REPORT      
_______________________________________________________________________________
 
Name:         WOODY MCNEAL               Room:          [address]    ADM [place] 
.R.#:    [id number]     Account #:     [id number]  
Admission:    02/10/22    Attend Phys:   Pavel Nunez
Discharge:                Date of Birth: [birth year]  
Date of Service: 22 0658  Report #:      [id number]
        [id number]
_______________________________________________________________________________
THIS REPORT FOR:  //name//                      
 
                          [facility]
                                       
Test Date:    2022               Test Time:    06:58:32
Pat Name:     WOODY MCNEAL              Department:   
Patient ID:   [id number]            Room:         [address]
Gender:       F                        Technician:   BXIONG
:          1950               Requested By: Marika Holt
Order Number: [id number]    Fredy MD:   Abdoulaye Sutton
                                 Measurements
Intervals                              Axis          
Rate:         81                       P:            64
MT:           143                      QRS:          47
QRSD:         90                       T:            58
QT:           358                                    
QTc:          416                                    
                           Interpretive Statements
Sinus rhythm
Probable left atrial enlargement
Compared to ECG 02/10/2022 15:05:29
No significant changes
Electronically Signed On 2022 11:02:56 CST by Abdoulaye Sutton
[email or web address]
 
 
 
 
 
 
 
 
 
 
 
 
 
 
 
 
 
 
 
 
  <ELECTRONICALLY SIGNED>
                                           By: Abdoulaye Sutton MD, [facility]     
  22     1102
D: 22 0658   _____________________________________
T: 22 0658   Abdoulaye Sutton MD, [facility]       /EPI

## 2022-02-13 VITALS — SYSTOLIC BLOOD PRESSURE: 148 MMHG | DIASTOLIC BLOOD PRESSURE: 88 MMHG

## 2022-02-13 VITALS — SYSTOLIC BLOOD PRESSURE: 129 MMHG | DIASTOLIC BLOOD PRESSURE: 65 MMHG

## 2022-02-13 VITALS — DIASTOLIC BLOOD PRESSURE: 61 MMHG | SYSTOLIC BLOOD PRESSURE: 129 MMHG

## 2022-02-13 VITALS — SYSTOLIC BLOOD PRESSURE: 138 MMHG | DIASTOLIC BLOOD PRESSURE: 73 MMHG

## 2022-02-13 VITALS — DIASTOLIC BLOOD PRESSURE: 78 MMHG | SYSTOLIC BLOOD PRESSURE: 138 MMHG

## 2022-02-13 VITALS — DIASTOLIC BLOOD PRESSURE: 84 MMHG | SYSTOLIC BLOOD PRESSURE: 145 MMHG

## 2022-02-13 PROCEDURE — 5A0935A ASSISTANCE WITH RESPIRATORY VENTILATION, LESS THAN 24 CONSECUTIVE HOURS, HIGH NASAL FLOW/VELOCITY: ICD-10-PCS | Performed by: INTERNAL MEDICINE

## 2022-02-14 VITALS — DIASTOLIC BLOOD PRESSURE: 92 MMHG | SYSTOLIC BLOOD PRESSURE: 142 MMHG

## 2022-02-14 VITALS — DIASTOLIC BLOOD PRESSURE: 75 MMHG | SYSTOLIC BLOOD PRESSURE: 169 MMHG

## 2022-02-14 VITALS — SYSTOLIC BLOOD PRESSURE: 158 MMHG | DIASTOLIC BLOOD PRESSURE: 93 MMHG

## 2022-02-14 VITALS — DIASTOLIC BLOOD PRESSURE: 66 MMHG | SYSTOLIC BLOOD PRESSURE: 134 MMHG

## 2022-02-14 VITALS — DIASTOLIC BLOOD PRESSURE: 93 MMHG | SYSTOLIC BLOOD PRESSURE: 158 MMHG

## 2022-02-24 ENCOUNTER — HOSPITAL ENCOUNTER (INPATIENT)
Dept: HOSPITAL 96 - M.ERS | Age: 72
LOS: 3 days | Discharge: HOME HEALTH | DRG: 602 | End: 2022-02-27
Attending: INTERNAL MEDICINE | Admitting: INTERNAL MEDICINE
Payer: MEDICARE

## 2022-02-24 VITALS — HEIGHT: 65.98 IN | WEIGHT: 235 LBS | BODY MASS INDEX: 37.77 KG/M2

## 2022-02-24 VITALS — SYSTOLIC BLOOD PRESSURE: 158 MMHG | DIASTOLIC BLOOD PRESSURE: 82 MMHG

## 2022-02-24 VITALS — SYSTOLIC BLOOD PRESSURE: 124 MMHG | DIASTOLIC BLOOD PRESSURE: 80 MMHG

## 2022-02-24 VITALS — DIASTOLIC BLOOD PRESSURE: 68 MMHG | SYSTOLIC BLOOD PRESSURE: 117 MMHG

## 2022-02-24 DIAGNOSIS — Z87.891: ICD-10-CM

## 2022-02-24 DIAGNOSIS — J18.9: ICD-10-CM

## 2022-02-24 DIAGNOSIS — M54.9: ICD-10-CM

## 2022-02-24 DIAGNOSIS — J44.0: ICD-10-CM

## 2022-02-24 DIAGNOSIS — Z20.822: ICD-10-CM

## 2022-02-24 DIAGNOSIS — Z86.711: ICD-10-CM

## 2022-02-24 DIAGNOSIS — G47.33: ICD-10-CM

## 2022-02-24 DIAGNOSIS — I50.9: ICD-10-CM

## 2022-02-24 DIAGNOSIS — R65.10: ICD-10-CM

## 2022-02-24 DIAGNOSIS — Z79.4: ICD-10-CM

## 2022-02-24 DIAGNOSIS — L03.116: Primary | ICD-10-CM

## 2022-02-24 DIAGNOSIS — G89.29: ICD-10-CM

## 2022-02-24 DIAGNOSIS — E03.9: ICD-10-CM

## 2022-02-24 DIAGNOSIS — E78.00: ICD-10-CM

## 2022-02-24 DIAGNOSIS — I11.0: ICD-10-CM

## 2022-02-24 DIAGNOSIS — E11.51: ICD-10-CM

## 2022-02-24 LAB
ABSOLUTE BASOPHILS: 0 THOU/UL (ref 0–0.2)
ABSOLUTE EOSINOPHILS: 0.1 THOU/UL (ref 0–0.7)
ABSOLUTE MONOCYTES: 0.5 THOU/UL (ref 0–1.2)
ALBUMIN SERPL-MCNC: 3 G/DL (ref 3.4–5)
ALP SERPL-CCNC: 129 U/L (ref 46–116)
ALT SERPL-CCNC: 37 U/L (ref 30–65)
ANION GAP SERPL CALC-SCNC: 4 MMOL/L (ref 7–16)
AST SERPL-CCNC: 35 U/L (ref 15–37)
BASOPHILS NFR BLD AUTO: 0.4 %
BILIRUB SERPL-MCNC: 0.3 MG/DL
BILIRUB UR-MCNC: NEGATIVE MG/DL
BUN SERPL-MCNC: 14 MG/DL (ref 7–18)
CALCIUM SERPL-MCNC: 8.6 MG/DL (ref 8.5–10.1)
CHLORIDE SERPL-SCNC: 100 MMOL/L (ref 98–107)
CO2 SERPL-SCNC: 36 MMOL/L (ref 21–32)
COLOR UR: YELLOW
CREAT SERPL-MCNC: 1.2 MG/DL (ref 0.6–1.3)
EOSINOPHIL NFR BLD: 1.4 %
GLUCOSE SERPL-MCNC: 363 MG/DL (ref 70–99)
GRANULOCYTES NFR BLD MANUAL: 76 %
HCT VFR BLD CALC: 36.4 % (ref 37–47)
HGB BLD-MCNC: 11.9 GM/DL (ref 12–15)
KETONES UR STRIP-MCNC: NEGATIVE MG/DL
LYMPHOCYTES # BLD: 1.1 THOU/UL (ref 0.8–5.3)
LYMPHOCYTES NFR BLD AUTO: 15 %
MCH RBC QN AUTO: 29.9 PG (ref 26–34)
MCHC RBC AUTO-ENTMCNC: 32.7 G/DL (ref 28–37)
MCV RBC: 91.3 FL (ref 80–100)
MONOCYTES NFR BLD: 7.2 %
MPV: 7.4 FL. (ref 7.2–11.1)
NEUTROPHILS # BLD: 5.5 THOU/UL (ref 1.6–8.1)
NT-PRO BRAIN NAT PEPTIDE: 57 PG/ML (ref ?–300)
NUCLEATED RBCS: 0 /100WBC
PLATELET COUNT*: 208 THOU/UL (ref 150–400)
POTASSIUM SERPL-SCNC: 4.2 MMOL/L (ref 3.5–5.1)
PROT SERPL-MCNC: 6.5 G/DL (ref 6.4–8.2)
PROT UR QL STRIP: NEGATIVE
RBC # BLD AUTO: 3.99 MIL/UL (ref 4.2–5)
RBC # UR STRIP: NEGATIVE /UL
RDW-CV: 15.2 % (ref 10.5–14.5)
SODIUM SERPL-SCNC: 140 MMOL/L (ref 136–145)
SP GR UR STRIP: >= 1.03 (ref 1–1.03)
URINE CLARITY: CLEAR
URINE GLUCOSE-RANDOM: (no result)
URINE LEUKOCYTES-REFLEX: NEGATIVE
URINE NITRITE-REFLEX: NEGATIVE
UROBILINOGEN UR STRIP-ACNC: 0.2 E.U./DL (ref 0.2–1)
WBC # BLD AUTO: 7.3 THOU/UL (ref 4–11)

## 2022-02-24 NOTE — EKG
[place] [address]
Phone:  [phone]                     ELECTROCARDIOGRAM REPORT      
_______________________________________________________________________________
 
Name:         WOODY MCNEAL               Room:                     PRE ER 
M.R.#:    [id number]     Account #:     [id number]  
Admission:                Attend Phys:                     
Discharge:                Date of Birth: [birth year]  
Date of Service: 22 1530  Report #:      [id number]
        [id number]
_______________________________________________________________________________
THIS REPORT FOR:  //name//                      
 
                         [facility] ED
                                       
Test Date:    2022               Test Time:    15:30:48
Pat Name:     WOODY MCNEAL              Department:   
Patient ID:   [id number]            Room:          
Gender:       F                        Technician:   TDS
:          1950               Requested By: Cheyenne Dueñas
Order Number: [id number] MD:   Abdoulaye Sutton
                                 Measurements
Intervals                              Axis          
Rate:         103                      P:            0
MS:           56                       QRS:          46
QRSD:         80                       T:            
QT:           349                                    
QTc:          457                                    
                           Interpretive Statements
Sinus tachycardia with occasional PACs
Probable left atrial enlargement
Low voltage, precordial leads
Abnormal R-wave progression, early transition
Marked baseline artifact
Compared to ECG 2022 06:58:32
Low QRS voltage now present
Sinus rate has increased
Electronically Signed On 2022 15:39:40 CST by Abdoulaye Sutton
[email or web address]
 
 
 
 
 
 
 
 
 
 
 
 
 
 
 
 
  <ELECTRONICALLY SIGNED>
                                           By: Abdoulaye Sutton MD, [facility]     
  22     1539
D: 22 1530   _____________________________________
T: 22 1530   Abdoulaye Sutton MD, [facility]       /EPI

## 2022-02-24 NOTE — EMS
[address]  [address]                    EMS Patient Care Report       
_______________________________________________________________________________
 
Name:       WOODY MCNEAL                Room:           [address] [place]  
[facility]#:  [id number]      Account #:      [id number]  
Admission:  22     Attend Phys:    DANK Han
Discharge:               Date of Birth:  [birth year]  
         Report #: [id number]
                                                                     [id number]
_______________________________________________________________________________
THIS REPORT FOR:  //name//                      
 
Report Transmitted: 2022 19:26
EMS Care Summary
[facility]
Incident  @ 2022 14:40
 
Incident Location
[address] [place] [address]
 
Patient
WOODY MCNEAL
Female, 71 Years
 1950
 
Patient Address
[address] [place] [address]
 
Patient History
Congestive Heart Failure (CHF),Chronic Obstructive Pulmonary Disease 
(COPD),Diabetes,Neuropathy,Sleep Apnea, 
 
Patient Allergies
No known allergies,
 
Patient Medications
Ropinirole, Allopurinol, Spironolactone, Magnesium Oxide, Atorvastatin, 
Aspirin, Amitriptyline, Trazodone, Metaproterenol, Levothyroxine, Potassium, 
Lidocaine, 
 
Chief Complaint
bi-lat lower leg pain
 
Disposition
Transported No Lights/[place]
 
Dispatch Reason
No Other Appropriate Choice
 
Transported To
[address]  [address]                    EMS Patient Care Report       
_______________________________________________________________________________
 
Name:       WOODY MCNEAL                Room:           [address] [place]  
[facility]#:  [id number]      Account #:      [id number]  
Admission:  22     Attend Phys:    DANK Han
Discharge:               Date of Birth:  [birth year]  
         Report #: [id number]
                                                                     [id number]
_______________________________________________________________________________
Narrative
Dispatched to a residence for 70y/o female with leg pain. Upon arrival pt. was 
sitting on as couch wearing O2 via NC, A&Ox4 c/o bi-lateral lower leg pain, 
redness and swelling. Pt. stated that this started 4 days ago and has steadily 
progressed. Pt. has history of poorly controlled DM. Pt. stated that her BG has 
been running high for approx. 2 weeks. Pt. O2 was removed for transfer to 
ambulance and pt. O2 sat. was 90% on RA with wheezing in upper fields. Duoneb 
was administered and NC replaced at 4lpm bring O2 sat. to 98% with work of 
breathing returning to normal. IV attempt was not successful. Pt. was 
transported to [place] for emergency services. 
 
Initial Vitals
@15:18P: 111,R: 20,BP: 163/85,GCS: 15,SpO2: 99,Revised Trauma: 12,
@15:05P: 109,R: 20,BP: 140/88,GCS: 15,SpO2: 100,Revised Trauma: 12,
@14:50P: 110,R: 20,BP: 150/85,GCS: 15,Glucose: 444,SpO2: 90,Revised Trauma: 12,
 
 
Impression
Extremity Pain
 
Procedures
@15:05 IV Therapy - Saline Lock cc (20 ga) Site: Antecubital-Left Response: 
UnchangedFailed 
@PTAOxygen FlowRate: 4 Device: Nasal Cannula (NC)  Response: UnchangedSucceeded
@14:55 Duoneb - 3.5 Milligrams (mg) - Non-Rebreather Mask Response: Improved
 
 
Timeline
PTA,Oxygen FlowRate: 4 Device: Nasal Cannula (NC) Response: UnchangedSucceeded,
14:38,Call Received
14:40,Dispatched
14:40,En Route
14:42,Initial Responder On Scene
14:42,On Scene
14:44,At Patient
14:50,BP: 150/85 M,PULSE: 110,RR: 20 R,SPO2: 90 Ox,ETCO2:  ,B,PAIN: ,GCS: 
15, 
14:55,Duoneb - 3.5 Milligrams (mg) - Non-Rebreather Mask,Response: Improved
14:59,Depart Scene
15:05,IV Therapy - Saline Lock cc 20 ga Site: Antecubital-Left,Response: 
UnchangedFailed, 
15:05,BP: 140/88 M,PULSE: 109,RR: 20 R,SPO2: 100 Ox,ETCO2:  ,BG: ,PAIN: ,GCS: 
15, 
15:18,BP: 163/85 M,PULSE: 111,RR: 20 R,SPO2: 99 Ox,ETCO2:  ,BG: ,PAIN: ,GCS: 15,
15:20,At Destination
15:22,Transfer Patient
 
 
 
[place] [address]                    EMS Patient Care Report       
_______________________________________________________________________________
 
Name:       WOODY MCNEAL                Room:           [address] [place]  
.R.#:  [id number]      Account #:      [id number]  
Admission:  22     Attend Phys:    DANK Han
Discharge:               Date of Birth:  [birth year]  
         Report #: [id number]
                                                                     [id number]
_______________________________________________________________________________
15:30,Call Closed
15:49,In District
 
Disclaimer
v1.1     Copyright  [employer]
This EMS Care Summary contains data elements from the applicable legal record 
(which may be displayed differently). It is designed to provide pertinent 
information for the following purposes: continuity of care, clinical quality, 
and state data reporting. The complete legal record is available to ED staff 
and administrators of the receiving hospital in [employer] Patient Tracker. All data 
is provided "as is."

## 2022-02-25 VITALS — SYSTOLIC BLOOD PRESSURE: 123 MMHG | DIASTOLIC BLOOD PRESSURE: 90 MMHG

## 2022-02-25 VITALS — DIASTOLIC BLOOD PRESSURE: 78 MMHG | SYSTOLIC BLOOD PRESSURE: 147 MMHG

## 2022-02-25 VITALS — DIASTOLIC BLOOD PRESSURE: 95 MMHG | SYSTOLIC BLOOD PRESSURE: 160 MMHG

## 2022-02-25 VITALS — SYSTOLIC BLOOD PRESSURE: 160 MMHG | DIASTOLIC BLOOD PRESSURE: 95 MMHG

## 2022-02-25 VITALS — SYSTOLIC BLOOD PRESSURE: 141 MMHG | DIASTOLIC BLOOD PRESSURE: 88 MMHG

## 2022-02-25 VITALS — DIASTOLIC BLOOD PRESSURE: 87 MMHG | SYSTOLIC BLOOD PRESSURE: 160 MMHG

## 2022-02-25 VITALS — SYSTOLIC BLOOD PRESSURE: 168 MMHG | DIASTOLIC BLOOD PRESSURE: 109 MMHG

## 2022-02-26 VITALS — DIASTOLIC BLOOD PRESSURE: 70 MMHG | SYSTOLIC BLOOD PRESSURE: 145 MMHG

## 2022-02-26 VITALS — SYSTOLIC BLOOD PRESSURE: 135 MMHG | DIASTOLIC BLOOD PRESSURE: 77 MMHG

## 2022-02-26 VITALS — DIASTOLIC BLOOD PRESSURE: 94 MMHG | SYSTOLIC BLOOD PRESSURE: 154 MMHG

## 2022-02-26 VITALS — DIASTOLIC BLOOD PRESSURE: 88 MMHG | SYSTOLIC BLOOD PRESSURE: 149 MMHG

## 2022-02-26 VITALS — SYSTOLIC BLOOD PRESSURE: 133 MMHG | DIASTOLIC BLOOD PRESSURE: 75 MMHG

## 2022-02-26 VITALS — SYSTOLIC BLOOD PRESSURE: 168 MMHG | DIASTOLIC BLOOD PRESSURE: 84 MMHG

## 2022-02-26 VITALS — SYSTOLIC BLOOD PRESSURE: 137 MMHG | DIASTOLIC BLOOD PRESSURE: 90 MMHG

## 2022-02-26 LAB
ANION GAP SERPL CALC-SCNC: 6 MMOL/L (ref 7–16)
BUN SERPL-MCNC: 10 MG/DL (ref 7–18)
CALCIUM SERPL-MCNC: 8 MG/DL (ref 8.5–10.1)
CHLORIDE SERPL-SCNC: 102 MMOL/L (ref 98–107)
CO2 SERPL-SCNC: 32 MMOL/L (ref 21–32)
CREAT SERPL-MCNC: 1 MG/DL (ref 0.6–1.3)
GLUCOSE SERPL-MCNC: 263 MG/DL (ref 70–99)
MAGNESIUM SERPL-MCNC: 1.9 MG/DL (ref 1.8–2.4)
PHOSPHATE SERPL-MCNC: 2 MG/DL (ref 2.5–4.9)
POTASSIUM SERPL-SCNC: 4.7 MMOL/L (ref 3.5–5.1)
SODIUM SERPL-SCNC: 140 MMOL/L (ref 136–145)

## 2022-02-27 VITALS — DIASTOLIC BLOOD PRESSURE: 71 MMHG | SYSTOLIC BLOOD PRESSURE: 134 MMHG

## 2022-02-27 VITALS — SYSTOLIC BLOOD PRESSURE: 132 MMHG | DIASTOLIC BLOOD PRESSURE: 73 MMHG

## 2022-02-27 VITALS — SYSTOLIC BLOOD PRESSURE: 149 MMHG | DIASTOLIC BLOOD PRESSURE: 91 MMHG

## 2022-02-27 VITALS — DIASTOLIC BLOOD PRESSURE: 95 MMHG | SYSTOLIC BLOOD PRESSURE: 160 MMHG

## 2022-02-27 VITALS — DIASTOLIC BLOOD PRESSURE: 79 MMHG | SYSTOLIC BLOOD PRESSURE: 140 MMHG

## 2022-02-27 LAB
ABSOLUTE MONOCYTES: 0.2 THOU/UL (ref 0–1.2)
ANION GAP SERPL CALC-SCNC: 3 MMOL/L (ref 7–16)
BUN SERPL-MCNC: 13 MG/DL (ref 7–18)
CALCIUM SERPL-MCNC: 8.2 MG/DL (ref 8.5–10.1)
CHLORIDE SERPL-SCNC: 103 MMOL/L (ref 98–107)
CO2 SERPL-SCNC: 35 MMOL/L (ref 21–32)
CREAT SERPL-MCNC: 0.8 MG/DL (ref 0.6–1.3)
GLUCOSE SERPL-MCNC: 244 MG/DL (ref 70–99)
GRANULOCYTES NFR BLD MANUAL: 74 %
HCT VFR BLD CALC: 33.3 % (ref 37–47)
HGB BLD-MCNC: 10.9 GM/DL (ref 12–15)
LYMPHOCYTES # BLD: 1.3 THOU/UL (ref 0.8–5.3)
LYMPHOCYTES NFR BLD AUTO: 17 %
MCH RBC QN AUTO: 30.2 PG (ref 26–34)
MCHC RBC AUTO-ENTMCNC: 32.7 G/DL (ref 28–37)
MCV RBC: 92.3 FL (ref 80–100)
METAMYELOCYTES NFR BLD: 1 %
MONOCYTES NFR BLD: 2 %
MPV: 7.8 FL. (ref 7.2–11.1)
NEUTROPHILS # BLD: 6.2 THOU/UL (ref 1.6–8.1)
NEUTS BAND NFR BLD: 6 %
NUCLEATED RBCS: 0 /100WBC
PLATELET # BLD EST: ADEQUATE 10*3/UL
PLATELET COUNT*: 194 THOU/UL (ref 150–400)
POTASSIUM SERPL-SCNC: 4.5 MMOL/L (ref 3.5–5.1)
RBC # BLD AUTO: 3.61 MIL/UL (ref 4.2–5)
RBC MORPH BLD: NORMAL
RDW-CV: 15.4 % (ref 10.5–14.5)
SODIUM SERPL-SCNC: 141 MMOL/L (ref 136–145)
WBC # BLD AUTO: 7.6 THOU/UL (ref 4–11)

## 2022-02-28 NOTE — EKG
[place] [address]
Phone:  [phone]                     ELECTROCARDIOGRAM REPORT      
_______________________________________________________________________________
 
Name:         WOODY MCNEAL               Room:          [address]    DIS IN 
..#:    [id number]     Account #:     [id number]  
Admission:    22    Attend Phys:   Pavel Nunez
Discharge:    22    Date of Birth: [birth year]  
Date of Service: 22 0644  Report #:      [id number]
        [id number]
_______________________________________________________________________________
THIS REPORT FOR:  //name//                      
 
                          [facility]
                                       
Test Date:    2022               Test Time:    06:44:37
Pat Name:     WOODY MCNEAL              Department:   
Patient ID:   [id number]            Room:         [address]
Gender:       F                        Technician:   AL
:          1950               Requested By: Itzel Mejia
Order Number: [id number]    Fredy MD:   Manish Garibay
                                 Measurements
Intervals                              Axis          
Rate:         90                       P:            70
HI:           147                      QRS:          45
QRSD:         89                       T:            55
QT:           368                                    
QTc:          451                                    
                           Interpretive Statements
Sinus rhythm
Compared to ECG 2022 15:30:48
Sinus tachycardia no longer present
Electronically Signed On 2022 9:47:25 CST by Manish Garibay
[email or web address]
 
 
 
 
 
 
 
 
 
 
 
 
 
 
 
 
 
 
 
 
 
  <ELECTRONICALLY SIGNED>
                                           By: Manish Garibay MD, [facility]      
  22     0947
D: 22 0644   _____________________________________
T: 22 0644   Manish Garibay MD, [facility]        /EPI